# Patient Record
Sex: MALE | Race: BLACK OR AFRICAN AMERICAN | NOT HISPANIC OR LATINO | Employment: FULL TIME | URBAN - METROPOLITAN AREA
[De-identification: names, ages, dates, MRNs, and addresses within clinical notes are randomized per-mention and may not be internally consistent; named-entity substitution may affect disease eponyms.]

---

## 2021-02-12 ENCOUNTER — OFFICE VISIT (OUTPATIENT)
Dept: PRIMARY CARE CLINIC | Facility: CLINIC | Age: 26
End: 2021-02-12
Payer: COMMERCIAL

## 2021-02-12 VITALS
BODY MASS INDEX: 22.03 KG/M2 | TEMPERATURE: 98 F | WEIGHT: 145.38 LBS | OXYGEN SATURATION: 98 % | HEART RATE: 89 BPM | DIASTOLIC BLOOD PRESSURE: 82 MMHG | HEIGHT: 68 IN | SYSTOLIC BLOOD PRESSURE: 128 MMHG

## 2021-02-12 DIAGNOSIS — F43.10 PTSD (POST-TRAUMATIC STRESS DISORDER): ICD-10-CM

## 2021-02-12 DIAGNOSIS — E10.69 TYPE 1 DIABETES MELLITUS WITH OTHER SPECIFIED COMPLICATION: Primary | ICD-10-CM

## 2021-02-12 DIAGNOSIS — Z11.3 SCREENING FOR STD (SEXUALLY TRANSMITTED DISEASE): ICD-10-CM

## 2021-02-12 PROCEDURE — 99204 OFFICE O/P NEW MOD 45 MIN: CPT | Mod: S$GLB,,, | Performed by: INTERNAL MEDICINE

## 2021-02-12 PROCEDURE — 99204 PR OFFICE/OUTPT VISIT, NEW, LEVL IV, 45-59 MIN: ICD-10-PCS | Mod: S$GLB,,, | Performed by: INTERNAL MEDICINE

## 2021-02-12 RX ORDER — INSULIN GLARGINE 100 [IU]/ML
INJECTION, SOLUTION SUBCUTANEOUS
COMMUNITY
End: 2021-02-12 | Stop reason: SDUPTHER

## 2021-02-12 RX ORDER — INSULIN ASPART 100 [IU]/ML
INJECTION, SOLUTION INTRAVENOUS; SUBCUTANEOUS
COMMUNITY
End: 2021-02-12 | Stop reason: SDUPTHER

## 2021-02-12 RX ORDER — INSULIN GLARGINE 100 [IU]/ML
25 INJECTION, SOLUTION SUBCUTANEOUS DAILY
Qty: 3 BOX | Refills: 3 | Status: SHIPPED | OUTPATIENT
Start: 2021-02-12 | End: 2021-04-12 | Stop reason: SDUPTHER

## 2021-02-12 RX ORDER — INSULIN ASPART 100 [IU]/ML
5 INJECTION, SOLUTION INTRAVENOUS; SUBCUTANEOUS
Qty: 3 BOX | Refills: 3 | Status: SHIPPED | OUTPATIENT
Start: 2021-02-12 | End: 2021-04-12 | Stop reason: SDUPTHER

## 2021-02-13 LAB
BUN SERPL-MCNC: 12 MG/DL (ref 7–25)
BUN/CREAT SERPL: ABNORMAL (CALC) (ref 6–22)
CALCIUM SERPL-MCNC: 10.1 MG/DL (ref 8.6–10.3)
CHLORIDE SERPL-SCNC: 100 MMOL/L (ref 98–110)
CHOLEST SERPL-MCNC: 186 MG/DL
CHOLEST/HDLC SERPL: 2.5 (CALC)
CO2 SERPL-SCNC: 30 MMOL/L (ref 20–32)
CREAT SERPL-MCNC: 0.98 MG/DL (ref 0.6–1.35)
GFRSERPLBLD MDRD-ARVRAT: 106 ML/MIN/1.73M2
GLUCOSE SERPL-MCNC: 181 MG/DL (ref 65–99)
HBA1C MFR BLD: 7.7 % OF TOTAL HGB
HDLC SERPL-MCNC: 75 MG/DL
LDLC SERPL CALC-MCNC: 96 MG/DL (CALC)
NONHDLC SERPL-MCNC: 111 MG/DL (CALC)
POTASSIUM SERPL-SCNC: 4 MMOL/L (ref 3.5–5.3)
SODIUM SERPL-SCNC: 142 MMOL/L (ref 135–146)
TRIGL SERPL-MCNC: 68 MG/DL
TSH SERPL-ACNC: 1.08 MIU/L (ref 0.4–4.5)

## 2021-02-15 LAB
HCV AB S/CO SERPL IA: 0.01
HCV AB SERPL QL IA: NORMAL
HIV 1+2 AB+HIV1 P24 AG SERPL QL IA: NORMAL

## 2021-02-17 RX ORDER — LANCETS
1 EACH MISCELLANEOUS 3 TIMES DAILY
COMMUNITY
End: 2021-02-17 | Stop reason: SDUPTHER

## 2021-02-17 RX ORDER — CALCIUM CITRATE/VITAMIN D3 200MG-6.25
1 TABLET ORAL 3 TIMES DAILY
Qty: 100 EACH | Refills: 4 | Status: SHIPPED | OUTPATIENT
Start: 2021-02-17 | End: 2021-04-12 | Stop reason: SDUPTHER

## 2021-02-17 RX ORDER — LANCETS
1 EACH MISCELLANEOUS 3 TIMES DAILY
Qty: 100 EACH | Refills: 4 | Status: SHIPPED | OUTPATIENT
Start: 2021-02-17 | End: 2021-04-12 | Stop reason: SDUPTHER

## 2021-02-17 RX ORDER — DEXTROSE 4 G
1 TABLET,CHEWABLE ORAL ONCE
COMMUNITY
End: 2021-02-17 | Stop reason: SDUPTHER

## 2021-02-17 RX ORDER — DEXTROSE 4 G
1 TABLET,CHEWABLE ORAL ONCE
Qty: 1 EACH | Refills: 0 | Status: SHIPPED | OUTPATIENT
Start: 2021-02-17 | End: 2021-04-12 | Stop reason: SDUPTHER

## 2021-04-12 DIAGNOSIS — E10.69 TYPE 1 DIABETES MELLITUS WITH OTHER SPECIFIED COMPLICATION: ICD-10-CM

## 2021-04-13 RX ORDER — INSULIN ASPART 100 [IU]/ML
5 INJECTION, SOLUTION INTRAVENOUS; SUBCUTANEOUS
Qty: 3 BOX | Refills: 3 | Status: SHIPPED | OUTPATIENT
Start: 2021-04-13 | End: 2021-07-29 | Stop reason: SDUPTHER

## 2021-04-13 RX ORDER — LANCETS
1 EACH MISCELLANEOUS 3 TIMES DAILY
Qty: 100 EACH | Refills: 4 | Status: SHIPPED | OUTPATIENT
Start: 2021-04-13 | End: 2022-05-25 | Stop reason: SDUPTHER

## 2021-04-13 RX ORDER — DEXTROSE 4 G
1 TABLET,CHEWABLE ORAL ONCE
Qty: 1 EACH | Refills: 0 | Status: SHIPPED | OUTPATIENT
Start: 2021-04-13 | End: 2023-10-31 | Stop reason: CLARIF

## 2021-04-13 RX ORDER — CALCIUM CITRATE/VITAMIN D3 200MG-6.25
1 TABLET ORAL 3 TIMES DAILY
Qty: 100 EACH | Refills: 4 | Status: SHIPPED | OUTPATIENT
Start: 2021-04-13 | End: 2022-05-25 | Stop reason: SDUPTHER

## 2021-04-13 RX ORDER — INSULIN GLARGINE 100 [IU]/ML
25 INJECTION, SOLUTION SUBCUTANEOUS DAILY
Qty: 3 BOX | Refills: 3 | Status: SHIPPED | OUTPATIENT
Start: 2021-04-13 | End: 2021-06-24

## 2021-07-29 DIAGNOSIS — E10.69 TYPE 1 DIABETES MELLITUS WITH OTHER SPECIFIED COMPLICATION: ICD-10-CM

## 2021-07-29 RX ORDER — INSULIN ASPART 100 [IU]/ML
5 INJECTION, SOLUTION INTRAVENOUS; SUBCUTANEOUS
Qty: 3 BOX | Refills: 3 | Status: SHIPPED | OUTPATIENT
Start: 2021-07-29 | End: 2021-07-29 | Stop reason: SDUPTHER

## 2021-07-30 RX ORDER — INSULIN ASPART 100 [IU]/ML
12 INJECTION, SOLUTION INTRAVENOUS; SUBCUTANEOUS
Qty: 99 ML | Refills: 3 | Status: SHIPPED | OUTPATIENT
Start: 2021-07-30 | End: 2022-05-18 | Stop reason: SDUPTHER

## 2022-05-18 DIAGNOSIS — E10.69 TYPE 1 DIABETES MELLITUS WITH OTHER SPECIFIED COMPLICATION: ICD-10-CM

## 2022-05-18 RX ORDER — INSULIN ASPART 100 [IU]/ML
12 INJECTION, SOLUTION INTRAVENOUS; SUBCUTANEOUS
Qty: 99 ML | Refills: 3 | Status: SHIPPED | OUTPATIENT
Start: 2022-05-18 | End: 2022-05-25 | Stop reason: SDUPTHER

## 2022-05-18 RX ORDER — INSULIN GLARGINE 100 [IU]/ML
INJECTION, SOLUTION SUBCUTANEOUS
Qty: 180 ML | Refills: 3 | Status: SHIPPED | OUTPATIENT
Start: 2022-05-18 | End: 2022-05-25 | Stop reason: SDUPTHER

## 2022-05-18 NOTE — TELEPHONE ENCOUNTER
----- Message from Tania Lowe MA sent at 5/17/2022  3:19 PM CDT -----    ----- Message -----  From: Maria Del Rosario Loya  Sent: 5/17/2022   2:50 PM CDT  To: [facility] Staff    Type:  Same Day Appointment Request    Caller is requesting a same day appointment.  Caller declined first available appointment listed below.    Name of Caller: Modesto Nguyễn  When is the first available appointment? 05/25   Symptoms: Refills/ eye doctor referral   Best Call Back Number: [phone] (home)   Additional Information: Pt said he's completely out of his insulin and would like a refill sent to his pharmacy to last him one week until his appointment on the 25th.        He uses:   [id number] [address] AT [facility]  [address] [place] [id number]  Phone: [phone] Fax: [phone]

## 2022-05-25 ENCOUNTER — OFFICE VISIT (OUTPATIENT)
Dept: PRIMARY CARE CLINIC | Facility: CLINIC | Age: 27
End: 2022-05-25
Payer: COMMERCIAL

## 2022-05-25 VITALS
SYSTOLIC BLOOD PRESSURE: 143 MMHG | WEIGHT: 156 LBS | DIASTOLIC BLOOD PRESSURE: 99 MMHG | BODY MASS INDEX: 23.64 KG/M2 | OXYGEN SATURATION: 99 % | HEART RATE: 90 BPM | HEIGHT: 68 IN

## 2022-05-25 DIAGNOSIS — Z28.21 COVID-19 VACCINE DOSE DECLINED: ICD-10-CM

## 2022-05-25 DIAGNOSIS — Z11.3 SCREENING FOR STDS (SEXUALLY TRANSMITTED DISEASES): Primary | ICD-10-CM

## 2022-05-25 DIAGNOSIS — R03.0 ELEVATED BLOOD PRESSURE READING: ICD-10-CM

## 2022-05-25 DIAGNOSIS — E10.69 TYPE 1 DIABETES MELLITUS WITH OTHER SPECIFIED COMPLICATION: ICD-10-CM

## 2022-05-25 PROCEDURE — 3080F PR MOST RECENT DIASTOLIC BLOOD PRESSURE >= 90 MM HG: ICD-10-PCS | Mod: CPTII,S$GLB,, | Performed by: INTERNAL MEDICINE

## 2022-05-25 PROCEDURE — 3077F PR MOST RECENT SYSTOLIC BLOOD PRESSURE >= 140 MM HG: ICD-10-PCS | Mod: CPTII,S$GLB,, | Performed by: INTERNAL MEDICINE

## 2022-05-25 PROCEDURE — 99214 PR OFFICE/OUTPT VISIT, EST, LEVL IV, 30-39 MIN: ICD-10-PCS | Mod: S$GLB,,, | Performed by: INTERNAL MEDICINE

## 2022-05-25 PROCEDURE — 99214 OFFICE O/P EST MOD 30 MIN: CPT | Mod: S$GLB,,, | Performed by: INTERNAL MEDICINE

## 2022-05-25 PROCEDURE — 1159F PR MEDICATION LIST DOCUMENTED IN MEDICAL RECORD: ICD-10-PCS | Mod: CPTII,S$GLB,, | Performed by: INTERNAL MEDICINE

## 2022-05-25 PROCEDURE — 3008F PR BODY MASS INDEX (BMI) DOCUMENTED: ICD-10-PCS | Mod: CPTII,S$GLB,, | Performed by: INTERNAL MEDICINE

## 2022-05-25 PROCEDURE — 1159F MED LIST DOCD IN RCRD: CPT | Mod: CPTII,S$GLB,, | Performed by: INTERNAL MEDICINE

## 2022-05-25 PROCEDURE — 3008F BODY MASS INDEX DOCD: CPT | Mod: CPTII,S$GLB,, | Performed by: INTERNAL MEDICINE

## 2022-05-25 PROCEDURE — 3080F DIAST BP >= 90 MM HG: CPT | Mod: CPTII,S$GLB,, | Performed by: INTERNAL MEDICINE

## 2022-05-25 PROCEDURE — 3077F SYST BP >= 140 MM HG: CPT | Mod: CPTII,S$GLB,, | Performed by: INTERNAL MEDICINE

## 2022-05-25 RX ORDER — INSULIN ASPART 100 [IU]/ML
12 INJECTION, SOLUTION INTRAVENOUS; SUBCUTANEOUS
Qty: 99 ML | Refills: 3 | Status: SHIPPED | OUTPATIENT
Start: 2022-05-25 | End: 2022-08-04

## 2022-05-25 RX ORDER — PEN NEEDLE, DIABETIC 30 GX3/16"
1 NEEDLE, DISPOSABLE MISCELLANEOUS
Qty: 150 EACH | Refills: 3 | Status: SHIPPED | OUTPATIENT
Start: 2022-05-25 | End: 2022-12-01

## 2022-05-25 RX ORDER — LANCETS
1 EACH MISCELLANEOUS 3 TIMES DAILY
Qty: 100 EACH | Refills: 4 | Status: SHIPPED | OUTPATIENT
Start: 2022-05-25 | End: 2023-10-31 | Stop reason: CLARIF

## 2022-05-25 RX ORDER — INSULIN GLARGINE 100 [IU]/ML
INJECTION, SOLUTION SUBCUTANEOUS
Qty: 180 ML | Refills: 3 | Status: SHIPPED | OUTPATIENT
Start: 2022-05-25 | End: 2023-06-30 | Stop reason: SDUPTHER

## 2022-05-25 NOTE — PROGRESS NOTES
Subjective:      Patient ID: Modesto Nguyễn is a 27 y.o. male.    Chief Complaint: Annual Exam (211 FASTING; WOULD LIKE TO DISCUSS GETTING THE PUMP FOR HIS DIABETES. Pin needles needed for his insulin pen. )    HPI     Patient here after a year and states he started checking his blood glucose 2 weeks ago. He did not take his insulin today because he did not eat. He takes Lantus 25 units at bedtime and does not have a sliding scale but just spot doses himself 12 units. He reports having hypoglycemic episodes of around 58 in the morning as he was not eating a lot at night and his blood glucose ended up going low. Eats at 7 pm and breakfast is around 12 pm so is going without food for about 12 hours.   He works but doesn't always get up in time to have breakfast. I don't have any of his readings. Highest blood glucose he has had was 300 and then rechecked it and was 220     Smokes marijuana, no cigarettes, occasional alcohol  No recent hospitalizations for his diabetes       Review of Systems   Constitutional: Negative for chills and fever.   Eyes: Positive for blurred vision.   Respiratory: Negative for cough, shortness of breath and wheezing.    Cardiovascular: Negative for chest pain, palpitations and leg swelling.   Gastrointestinal: Negative for abdominal pain, constipation, diarrhea, nausea and vomiting.   Genitourinary: Negative for dysuria, frequency and urgency.   Musculoskeletal: Negative for falls and joint pain.   Skin: Negative for itching and rash.   Neurological: Negative for dizziness and headaches.   Psychiatric/Behavioral: Negative for depression. The patient is not nervous/anxious.      Objective:     Physical Exam  Vitals reviewed.   HENT:      Head: Normocephalic.   Eyes:      Extraocular Movements: Extraocular movements intact.      Conjunctiva/sclera: Conjunctivae normal.      Pupils: Pupils are equal, round, and reactive to light.   Cardiovascular:      Rate and Rhythm: Normal rate and regular  "rhythm.   Pulmonary:      Effort: Pulmonary effort is normal.      Breath sounds: Normal breath sounds.   Abdominal:      General: Bowel sounds are normal.   Musculoskeletal:         General: Normal range of motion.   Skin:     General: Skin is warm.      Capillary Refill: Capillary refill takes less than 2 seconds.   Neurological:      General: No focal deficit present.      Mental Status: He is alert and oriented to person, place, and time.   Psychiatric:         Mood and Affect: Mood normal.        BP (!) 143/99 (BP Location: Right arm, Patient Position: Sitting, BP Method: Medium (Automatic))   Pulse 90   Ht 5' 8" (1.727 m)   Wt 70.8 kg (156 lb)   SpO2 99%   BMI 23.72 kg/m²     Assessment:       ICD-10-CM ICD-9-CM   1. Screening for STDs (sexually transmitted diseases)  Z11.3 V74.5   2. Type 1 diabetes mellitus with other specified complication  E10.69 250.81   3. Elevated blood pressure reading  R03.0 796.2   4. COVID-19 vaccine dose declined  Z28.21 V64.06       Plan:     Medication List with Changes/Refills   New Medications    PEN NEEDLE, DIABETIC (PEN NEEDLE) 32 GAUGE X 5/32" NDLE    1 each by Misc.(Non-Drug; Combo Route) route 5 (five) times daily.   Current Medications    BLOOD-GLUCOSE METER MISC    1 Device by Misc.(Non-Drug; Combo Route) route once. Please give patient Riaz metrix  monitor to check sugar three times daily. for 1 dose   Changed and/or Refilled Medications    Modified Medication Previous Medication    BLOOD SUGAR DIAGNOSTIC (TRUE METRIX GLUCOSE TEST STRIP) STRP blood sugar diagnostic (TRUE METRIX GLUCOSE TEST STRIP) Strp       1 strip by Misc.(Non-Drug; Combo Route) route 3 (three) times daily. Use monitor and strips to check blood sugar three times daily.    1 strip by Misc.(Non-Drug; Combo Route) route 3 (three) times daily. Use monitor and strips to check blood sugar three times daily.    INSULIN (LANTUS SOLOSTAR U-100 INSULIN) GLARGINE 100 UNITS/ML (3ML) SUBQ PEN insulin (LANTUS " SOLOSTAR U-100 INSULIN) glargine 100 units/mL (3mL) SubQ pen       INJECT 25 UNITS UNDER THE SKIN EVERY DAY    INJECT 25 UNITS UNDER THE SKIN EVERY DAY    INSULIN ASPART U-100 (NOVOLOG FLEXPEN U-100 INSULIN) 100 UNIT/ML (3 ML) INPN PEN insulin aspart U-100 (NOVOLOG FLEXPEN U-100 INSULIN) 100 unit/mL (3 mL) InPn pen       Inject 12 Units into the skin 3 (three) times daily with meals.    Inject 12 Units into the skin 3 (three) times daily with meals.    LANCETS (LANCETS,ULTRA THIN) 26 GAUGE MISC lancets (LANCETS,ULTRA THIN) 26 gauge Misc       1 lancet by Misc.(Non-Drug; Combo Route) route 3 (three) times daily. Please give patient lancets and lancing device to check sugars three times daily.    1 lancet by Misc.(Non-Drug; Combo Route) route 3 (three) times daily. Please give patient lancets and lancing device to check sugars three times daily.        Screening for STDs (sexually transmitted diseases)  -     HIV 1/2 Ag/Ab (4th Gen); Future; Expected date: 05/25/2022  -     Hepatitis C Antibody; Future; Expected date: 05/25/2022    Type 1 diabetes mellitus with other specified complication  -     insulin aspart U-100 (NOVOLOG FLEXPEN U-100 INSULIN) 100 unit/mL (3 mL) InPn pen; Inject 12 Units into the skin 3 (three) times daily with meals.  Dispense: 99 mL; Refill: 3  -     insulin (LANTUS SOLOSTAR U-100 INSULIN) glargine 100 units/mL (3mL) SubQ pen; INJECT 25 UNITS UNDER THE SKIN EVERY DAY  Dispense: 180 mL; Refill: 3  -     blood sugar diagnostic (TRUE METRIX GLUCOSE TEST STRIP) Strp; 1 strip by Misc.(Non-Drug; Combo Route) route 3 (three) times daily. Use monitor and strips to check blood sugar three times daily.  Dispense: 100 each; Refill: 4  -     lancets (LANCETS,ULTRA THIN) 26 gauge Misc; 1 lancet by Misc.(Non-Drug; Combo Route) route 3 (three) times daily. Please give patient lancets and lancing device to check sugars three times daily.  Dispense: 100 each; Refill: 4  -     pen needle, diabetic (PEN NEEDLE)  "32 gauge x 5/32" Ndle; 1 each by Misc.(Non-Drug; Combo Route) route 5 (five) times daily.  Dispense: 150 each; Refill: 3  -     CBC Auto Differential; Future; Expected date: 05/25/2022  -     Comprehensive Metabolic Panel; Future; Expected date: 05/25/2022  -     Lipid Panel; Future; Expected date: 05/25/2022  -     Hemoglobin A1C; Future; Expected date: 05/25/2022  -     TSH; Future; Expected date: 05/25/2022  -     Ambulatory referral/consult to Ophthalmology; Future; Expected date: 06/01/2022  -     Microalbumin/creatinine urine ratio    Elevated blood pressure reading    COVID-19 vaccine dose declined                          "

## 2022-05-26 LAB
ALBUMIN SERPL-MCNC: 4.6 G/DL (ref 3.6–5.1)
ALBUMIN/CREAT UR: 287 MCG/MG CREAT
ALBUMIN/GLOB SERPL: 1.6 (CALC) (ref 1–2.5)
ALP SERPL-CCNC: 82 U/L (ref 36–130)
ALT SERPL-CCNC: 12 U/L (ref 9–46)
AST SERPL-CCNC: 16 U/L (ref 10–40)
BASOPHILS # BLD AUTO: 18 CELLS/UL (ref 0–200)
BASOPHILS NFR BLD AUTO: 0.4 %
BILIRUB SERPL-MCNC: 0.7 MG/DL (ref 0.2–1.2)
BUN SERPL-MCNC: 10 MG/DL (ref 7–25)
BUN/CREAT SERPL: ABNORMAL (CALC) (ref 6–22)
CALCIUM SERPL-MCNC: 9.8 MG/DL (ref 8.6–10.3)
CHLORIDE SERPL-SCNC: 101 MMOL/L (ref 98–110)
CHOLEST SERPL-MCNC: 216 MG/DL
CHOLEST/HDLC SERPL: 3 (CALC)
CO2 SERPL-SCNC: 30 MMOL/L (ref 20–32)
CREAT SERPL-MCNC: 0.9 MG/DL (ref 0.6–1.35)
CREAT UR-MCNC: 149 MG/DL (ref 20–320)
EOSINOPHIL # BLD AUTO: 198 CELLS/UL (ref 15–500)
EOSINOPHIL NFR BLD AUTO: 4.3 %
ERYTHROCYTE [DISTWIDTH] IN BLOOD BY AUTOMATED COUNT: 13.1 % (ref 11–15)
GLOBULIN SER CALC-MCNC: 2.8 G/DL (CALC) (ref 1.9–3.7)
GLUCOSE SERPL-MCNC: 207 MG/DL (ref 65–99)
HBA1C MFR BLD: 9.6 % OF TOTAL HGB
HCT VFR BLD AUTO: 45.6 % (ref 38.5–50)
HCV AB S/CO SERPL IA: 0.02
HCV AB SERPL QL IA: NORMAL
HDLC SERPL-MCNC: 71 MG/DL
HGB BLD-MCNC: 15.2 G/DL (ref 13.2–17.1)
HIV 1+2 AB+HIV1 P24 AG SERPL QL IA: NORMAL
LDLC SERPL CALC-MCNC: 131 MG/DL (CALC)
LYMPHOCYTES # BLD AUTO: 1403 CELLS/UL (ref 850–3900)
LYMPHOCYTES NFR BLD AUTO: 30.5 %
MCH RBC QN AUTO: 29.6 PG (ref 27–33)
MCHC RBC AUTO-ENTMCNC: 33.3 G/DL (ref 32–36)
MCV RBC AUTO: 88.9 FL (ref 80–100)
MICROALBUMIN UR-MCNC: 42.7 MG/DL
MONOCYTES # BLD AUTO: 368 CELLS/UL (ref 200–950)
MONOCYTES NFR BLD AUTO: 8 %
NEUTROPHILS # BLD AUTO: 2613 CELLS/UL (ref 1500–7800)
NEUTROPHILS NFR BLD AUTO: 56.8 %
NONHDLC SERPL-MCNC: 145 MG/DL (CALC)
PLATELET # BLD AUTO: 275 THOUSAND/UL (ref 140–400)
PMV BLD REES-ECKER: 11.8 FL (ref 7.5–12.5)
POTASSIUM SERPL-SCNC: 4.3 MMOL/L (ref 3.5–5.3)
PROT SERPL-MCNC: 7.4 G/DL (ref 6.1–8.1)
RBC # BLD AUTO: 5.13 MILLION/UL (ref 4.2–5.8)
SODIUM SERPL-SCNC: 139 MMOL/L (ref 135–146)
TRIGL SERPL-MCNC: 47 MG/DL
TSH SERPL-ACNC: 1.46 MIU/L (ref 0.4–4.5)
WBC # BLD AUTO: 4.6 THOUSAND/UL (ref 3.8–10.8)

## 2022-06-02 ENCOUNTER — TELEPHONE (OUTPATIENT)
Dept: PRIMARY CARE CLINIC | Facility: CLINIC | Age: 27
End: 2022-06-02
Payer: COMMERCIAL

## 2022-06-02 NOTE — TELEPHONE ENCOUNTER
"Wanted to r/s his appt due to a "viral infection."     ----- Message from Padmini Rob sent at 6/2/2022  2:45 PM CDT -----  Contact: PT      Who Called Modesto      Does the patient know what this is regarding?: glucose readings   Would the patient rather a call back or a response via [facility]?  Callback   Best Call Back Number: .[phone] (home)        Additional Information:  pt could not drop off results            "

## 2022-06-22 ENCOUNTER — TELEPHONE (OUTPATIENT)
Dept: PRIMARY CARE CLINIC | Facility: CLINIC | Age: 27
End: 2022-06-22
Payer: COMMERCIAL

## 2022-06-22 NOTE — TELEPHONE ENCOUNTER
Pt advised all his labs were normal from this testing except for the diabetes and he would discuss this with her at the next visit. Pt verbalized understanding.       ----- Message from Monalisa Chapman LPN sent at 6/21/2022  4:08 PM CDT -----  Regarding: FW: Blood Work  Contact: patient    ----- Message -----  From: Eugenia Guillen  Sent: 6/21/2022   3:16 PM CDT  To: [facility] Staff  Subject: Blood Work                                       Per phone call with patient, she stated that he would like to have blood work to be done to check to see if cancer,HIV.  The caller would like to be tested for the whole thing.  Please return call at [phone].    Thanks,  SJ

## 2022-06-23 ENCOUNTER — OFFICE VISIT (OUTPATIENT)
Dept: PRIMARY CARE CLINIC | Facility: CLINIC | Age: 27
End: 2022-06-23
Payer: COMMERCIAL

## 2022-06-23 VITALS
BODY MASS INDEX: 23.31 KG/M2 | HEIGHT: 68 IN | OXYGEN SATURATION: 98 % | SYSTOLIC BLOOD PRESSURE: 131 MMHG | DIASTOLIC BLOOD PRESSURE: 82 MMHG | WEIGHT: 153.81 LBS | HEART RATE: 88 BPM

## 2022-06-23 DIAGNOSIS — U07.1 COVID-19: Primary | ICD-10-CM

## 2022-06-23 PROCEDURE — 99214 OFFICE O/P EST MOD 30 MIN: CPT | Mod: S$GLB,,, | Performed by: INTERNAL MEDICINE

## 2022-06-23 PROCEDURE — 3079F DIAST BP 80-89 MM HG: CPT | Mod: CPTII,S$GLB,, | Performed by: INTERNAL MEDICINE

## 2022-06-23 PROCEDURE — 3060F PR POS MICROALBUMINURIA RESULT DOCUMENTED/REVIEW: ICD-10-PCS | Mod: CPTII,S$GLB,, | Performed by: INTERNAL MEDICINE

## 2022-06-23 PROCEDURE — 3046F HEMOGLOBIN A1C LEVEL >9.0%: CPT | Mod: CPTII,S$GLB,, | Performed by: INTERNAL MEDICINE

## 2022-06-23 PROCEDURE — 3066F PR DOCUMENTATION OF TREATMENT FOR NEPHROPATHY: ICD-10-PCS | Mod: CPTII,S$GLB,, | Performed by: INTERNAL MEDICINE

## 2022-06-23 PROCEDURE — 3008F PR BODY MASS INDEX (BMI) DOCUMENTED: ICD-10-PCS | Mod: CPTII,S$GLB,, | Performed by: INTERNAL MEDICINE

## 2022-06-23 PROCEDURE — 3066F NEPHROPATHY DOC TX: CPT | Mod: CPTII,S$GLB,, | Performed by: INTERNAL MEDICINE

## 2022-06-23 PROCEDURE — 3075F SYST BP GE 130 - 139MM HG: CPT | Mod: CPTII,S$GLB,, | Performed by: INTERNAL MEDICINE

## 2022-06-23 PROCEDURE — 99214 PR OFFICE/OUTPT VISIT, EST, LEVL IV, 30-39 MIN: ICD-10-PCS | Mod: S$GLB,,, | Performed by: INTERNAL MEDICINE

## 2022-06-23 PROCEDURE — 3079F PR MOST RECENT DIASTOLIC BLOOD PRESSURE 80-89 MM HG: ICD-10-PCS | Mod: CPTII,S$GLB,, | Performed by: INTERNAL MEDICINE

## 2022-06-23 PROCEDURE — 3046F PR MOST RECENT HEMOGLOBIN A1C LEVEL > 9.0%: ICD-10-PCS | Mod: CPTII,S$GLB,, | Performed by: INTERNAL MEDICINE

## 2022-06-23 PROCEDURE — 3075F PR MOST RECENT SYSTOLIC BLOOD PRESS GE 130-139MM HG: ICD-10-PCS | Mod: CPTII,S$GLB,, | Performed by: INTERNAL MEDICINE

## 2022-06-23 PROCEDURE — 3060F POS MICROALBUMINURIA REV: CPT | Mod: CPTII,S$GLB,, | Performed by: INTERNAL MEDICINE

## 2022-06-23 PROCEDURE — 3008F BODY MASS INDEX DOCD: CPT | Mod: CPTII,S$GLB,, | Performed by: INTERNAL MEDICINE

## 2022-06-23 NOTE — PROGRESS NOTES
"Subjective:      Patient ID: Modesto Nguyễn is a 27 y.o. male.    Chief Complaint: Follow-up, Cough (Patient states he has had all s/s for one week with the diarrhea started two days ago. Pt states he went to [facility] about two -3 weeks ago URI. Both flu and COVID test were negative at that time. He never did get his antibiotics filled. ), Generalized Body Aches, Diarrhea, and Nasal Congestion    HPI     Patient tested positive for covid in clinic today. He is still refusing the vaccine. He did not bring his blood glucose log but he states he is checking it 4-5 times a day and giving himself 5 injections a day. He reports a level of 36 an hour after he took his novolog because he did not count his carbs properly. He just drove in from [place]    ROS     As above  Objective:     Physical Exam  Vitals reviewed.   Constitutional:       Appearance: Normal appearance.   HENT:      Head: Normocephalic.   Eyes:      Extraocular Movements: Extraocular movements intact.      Conjunctiva/sclera: Conjunctivae normal.      Pupils: Pupils are equal, round, and reactive to light.   Skin:     General: Skin is warm.      Capillary Refill: Capillary refill takes less than 2 seconds.   Neurological:      General: No focal deficit present.      Mental Status: He is alert.   Psychiatric:         Mood and Affect: Mood normal.        /82 (BP Location: Right arm, Patient Position: Sitting, BP Method: Medium (Automatic))   Pulse 88   Ht 5' 8" (1.727 m)   Wt 69.8 kg (153 lb 12.8 oz)   SpO2 98%   BMI 23.39 kg/m²     Assessment:       ICD-10-CM ICD-9-CM   1. COVID-19  U07.1 079.89       Plan:     Medication List with Changes/Refills   New Medications    NIRMATRELVIR-RITONAVIR 150 MG X 2- 100 MG COPACKAGED TABLETS (EUA)    Take 3 tablets by mouth 2 (two) times daily for 5 days. Each dose contains 2 nirmatrelvir (pink tablets) and 1 ritonavir (white tablet). Take all 3 tablets together   Current Medications    BLOOD SUGAR DIAGNOSTIC " "(TRUE METRIX GLUCOSE TEST STRIP) STRP    1 strip by Misc.(Non-Drug; Combo Route) route 3 (three) times daily. Use monitor and strips to check blood sugar three times daily.    BLOOD-GLUCOSE METER MISC    1 Device by Misc.(Non-Drug; Combo Route) route once. Please give patient Riaz metrix  monitor to check sugar three times daily. for 1 dose    INSULIN (LANTUS SOLOSTAR U-100 INSULIN) GLARGINE 100 UNITS/ML (3ML) SUBQ PEN    INJECT 25 UNITS UNDER THE SKIN EVERY DAY    INSULIN ASPART U-100 (NOVOLOG FLEXPEN U-100 INSULIN) 100 UNIT/ML (3 ML) INPN PEN    Inject 12 Units into the skin 3 (three) times daily with meals.    LANCETS (LANCETS,ULTRA THIN) 26 GAUGE MISC    1 lancet by Misc.(Non-Drug; Combo Route) route 3 (three) times daily. Please give patient lancets and lancing device to check sugars three times daily.    PEN NEEDLE, DIABETIC (PEN NEEDLE) 32 GAUGE X 5/32" NDLE    1 each by Misc.(Non-Drug; Combo Route) route 5 (five) times daily.        COVID-19  -     nirmatrelvir-ritonavir 150 mg x 2- 100 mg copackaged tablets (EUA); Take 3 tablets by mouth 2 (two) times daily for 5 days. Each dose contains 2 nirmatrelvir (pink tablets) and 1 ritonavir (white tablet). Take all 3 tablets together  Dispense: 30 tablet; Refill: 0         Patient info on paxlovid provided. He will return in 2 weeks with his blood glucose report. I discussed starting omnipod and getting him a dexcom and he is interested. Monitor blood glucose and if appetite is decreased due to nausea/diarrhea he needs to decrease his short acting insulin. Needs to drink plenty of water. Vitals stable in clinic today                  "

## 2022-08-02 DIAGNOSIS — E10.69 TYPE 1 DIABETES MELLITUS WITH OTHER SPECIFIED COMPLICATION: ICD-10-CM

## 2022-08-04 RX ORDER — INSULIN ASPART 100 [IU]/ML
INJECTION, SOLUTION INTRAVENOUS; SUBCUTANEOUS
Qty: 99 ML | Refills: 3 | Status: SHIPPED | OUTPATIENT
Start: 2022-08-04 | End: 2023-06-30 | Stop reason: SDUPTHER

## 2022-09-09 ENCOUNTER — PATIENT OUTREACH (OUTPATIENT)
Dept: ADMINISTRATIVE | Facility: HOSPITAL | Age: 27
End: 2022-09-09
Payer: COMMERCIAL

## 2022-09-09 NOTE — PROGRESS NOTES
Working a1c gap report. Pt is overdue for a repeat A1C. All other DM labs are up to date. Pt was a no show for his last appt. Msg sent to PCP about A1C being due.

## 2022-09-09 NOTE — Clinical Note
I am currently working a report for pts with DM. Pt is overdue for a repeat A1C. Was a no show for last appt. Thank you.

## 2023-02-14 ENCOUNTER — PATIENT OUTREACH (OUTPATIENT)
Dept: ADMINISTRATIVE | Facility: HOSPITAL | Age: 28
End: 2023-02-14
Payer: COMMERCIAL

## 2023-02-14 NOTE — PROGRESS NOTES
DM Report: Attempted to contact pt in regards to overdue Ha1c,and offer follow up appt with provider, no ans, unable to lvm, last appt 06/23/22.

## 2023-06-29 ENCOUNTER — PATIENT MESSAGE (OUTPATIENT)
Dept: PRIMARY CARE CLINIC | Facility: CLINIC | Age: 28
End: 2023-06-29
Payer: COMMERCIAL

## 2023-06-30 ENCOUNTER — PATIENT MESSAGE (OUTPATIENT)
Dept: PRIMARY CARE CLINIC | Facility: CLINIC | Age: 28
End: 2023-06-30
Payer: COMMERCIAL

## 2023-06-30 DIAGNOSIS — E10.69 TYPE 1 DIABETES MELLITUS WITH OTHER SPECIFIED COMPLICATION: ICD-10-CM

## 2023-06-30 NOTE — TELEPHONE ENCOUNTER
"----- Message from Soraya Matute sent at 6/29/2023  1:26 PM CDT -----  Contact: pt  Pt calling for status of refill for NOVOLOG FLEXPEN U-100 INSULIN 100 unit/mL (3 mL) InPn pen, pen needle, diabetic (BD TUSHAR 2ND GEN PEN NEEDLE) 32 gauge x 5/32" Ndle and basalog insulin and pt is out and needs refills asap.  Pt can be reached at [phone].  Pt called yesterday w/no reply yet.        [id number] - [address] [address]  Phone: [phone] Fax: [phone]    Thanks,            "

## 2023-07-01 RX ORDER — INSULIN GLARGINE 100 [IU]/ML
INJECTION, SOLUTION SUBCUTANEOUS
Qty: 180 ML | Refills: 3 | Status: SHIPPED | OUTPATIENT
Start: 2023-07-01 | End: 2023-07-07 | Stop reason: ALTCHOICE

## 2023-07-01 RX ORDER — PEN NEEDLE, DIABETIC 30 GX3/16"
NEEDLE, DISPOSABLE MISCELLANEOUS
Qty: 200 EACH | Refills: 3 | Status: SHIPPED | OUTPATIENT
Start: 2023-07-01 | End: 2023-10-31

## 2023-07-01 RX ORDER — INSULIN ASPART 100 [IU]/ML
INJECTION, SOLUTION INTRAVENOUS; SUBCUTANEOUS
Qty: 99 ML | Refills: 3 | Status: SHIPPED | OUTPATIENT
Start: 2023-07-01 | End: 2024-03-13 | Stop reason: SDUPTHER

## 2023-07-05 ENCOUNTER — TELEPHONE (OUTPATIENT)
Dept: PRIMARY CARE CLINIC | Facility: CLINIC | Age: 28
End: 2023-07-05
Payer: MEDICAID

## 2023-07-05 NOTE — TELEPHONE ENCOUNTER
Patient has been advised his med has been sent to the pharmacy but must make an appointment for a follow up, since it has been over a year. Pt parked to schedule with  MEGHANA Baer.     My Note Signed   2:36 PM       ----- Message from Luann Hall sent at 7/5/2023 10:03 AM CDT -----  Patient is calling in regards to medication not sent to pharmacy..please call him back at [phone]

## 2023-07-05 NOTE — TELEPHONE ENCOUNTER
----- Message from Luann Hall sent at 7/5/2023 10:03 AM CDT -----  Patient is calling in regards to medication not sent to pharmacy..please call him back at [phone]

## 2023-07-06 ENCOUNTER — TELEPHONE (OUTPATIENT)
Dept: PRIMARY CARE CLINIC | Facility: CLINIC | Age: 28
End: 2023-07-06
Payer: MEDICAID

## 2023-07-06 NOTE — TELEPHONE ENCOUNTER
The pharmacy states the insurance is asking for the Basaglar.     ----- Message from Stella Perez sent at 7/6/2023  1:25 PM CDT -----  Contact: self  Pt states the generic version of lantus is needing to be called out, what was called out is not covered by his insurance. Please call back at [phone]

## 2023-07-07 DIAGNOSIS — E10.69 TYPE 1 DIABETES MELLITUS WITH OTHER SPECIFIED COMPLICATION: Primary | ICD-10-CM

## 2023-07-07 RX ORDER — INSULIN GLARGINE 100 [IU]/ML
25 INJECTION, SOLUTION SUBCUTANEOUS DAILY
Qty: 25 ML | Refills: 0 | Status: SHIPPED | OUTPATIENT
Start: 2023-07-07 | End: 2023-08-20

## 2023-07-26 ENCOUNTER — OFFICE VISIT (OUTPATIENT)
Dept: PRIMARY CARE CLINIC | Facility: CLINIC | Age: 28
End: 2023-07-26
Payer: MEDICAID

## 2023-07-26 ENCOUNTER — PATIENT MESSAGE (OUTPATIENT)
Dept: ADMINISTRATIVE | Facility: HOSPITAL | Age: 28
End: 2023-07-26
Payer: MEDICAID

## 2023-07-26 VITALS
OXYGEN SATURATION: 97 % | DIASTOLIC BLOOD PRESSURE: 92 MMHG | HEIGHT: 68 IN | SYSTOLIC BLOOD PRESSURE: 135 MMHG | WEIGHT: 155 LBS | BODY MASS INDEX: 23.49 KG/M2

## 2023-07-26 DIAGNOSIS — E10.69 TYPE 1 DIABETES MELLITUS WITH OTHER SPECIFIED COMPLICATION: Primary | ICD-10-CM

## 2023-07-26 DIAGNOSIS — Z11.3 SCREENING EXAMINATION FOR STD (SEXUALLY TRANSMITTED DISEASE): ICD-10-CM

## 2023-07-26 PROCEDURE — 99214 PR OFFICE/OUTPT VISIT, EST, LEVL IV, 30-39 MIN: ICD-10-PCS | Mod: S$GLB,,, | Performed by: INTERNAL MEDICINE

## 2023-07-26 PROCEDURE — 3008F BODY MASS INDEX DOCD: CPT | Mod: CPTII,S$GLB,, | Performed by: INTERNAL MEDICINE

## 2023-07-26 PROCEDURE — 3075F SYST BP GE 130 - 139MM HG: CPT | Mod: CPTII,S$GLB,, | Performed by: INTERNAL MEDICINE

## 2023-07-26 PROCEDURE — 99214 OFFICE O/P EST MOD 30 MIN: CPT | Mod: S$GLB,,, | Performed by: INTERNAL MEDICINE

## 2023-07-26 PROCEDURE — 3080F DIAST BP >= 90 MM HG: CPT | Mod: CPTII,S$GLB,, | Performed by: INTERNAL MEDICINE

## 2023-07-26 PROCEDURE — 3008F PR BODY MASS INDEX (BMI) DOCUMENTED: ICD-10-PCS | Mod: CPTII,S$GLB,, | Performed by: INTERNAL MEDICINE

## 2023-07-26 PROCEDURE — 3080F PR MOST RECENT DIASTOLIC BLOOD PRESSURE >= 90 MM HG: ICD-10-PCS | Mod: CPTII,S$GLB,, | Performed by: INTERNAL MEDICINE

## 2023-07-26 PROCEDURE — 3075F PR MOST RECENT SYSTOLIC BLOOD PRESS GE 130-139MM HG: ICD-10-PCS | Mod: CPTII,S$GLB,, | Performed by: INTERNAL MEDICINE

## 2023-07-26 NOTE — PROGRESS NOTES
Subjective:      Patient ID: Modesto Nguyễn is a 28 y.o. male.    Chief Complaint: Referral (To the eye clinic- due to a floater for a few months intermittent. )       HPI    Past Medical History:   Diagnosis Date    COVID     Type 1 diabetes mellitus        Patient presents to clinic after a year. He is type 1 diabetic on insulin   Basaglar 25 units, reports hypoglycemia of 42, states morning blood glucose today was 130. He is spot dosing himself with novolog as he is not checking his blood glucose regularly, likely that is the reason he has hypoglycemia   I did refer him to the eye clinic last year but he didn't go, states he is seeing floaters in his eyes and needs another referral    Sexually active, once change in partner recently in the last 2 months      Review of Systems   Constitutional:  Negative for chills and fever.   HENT:  Negative for hearing loss.    Eyes:  Negative for blurred vision.        Floaters   Respiratory:  Negative for cough, shortness of breath and wheezing.    Cardiovascular:  Negative for chest pain, palpitations and leg swelling.   Gastrointestinal:  Negative for abdominal pain, blood in stool, constipation, diarrhea, melena, nausea and vomiting.   Genitourinary:  Negative for dysuria, frequency and urgency.   Musculoskeletal:  Negative for falls.   Skin:  Negative for rash.   Neurological:  Negative for dizziness and headaches.   Endo/Heme/Allergies:  Does not bruise/bleed easily.   Psychiatric/Behavioral:  Negative for depression. The patient is not nervous/anxious.    Objective:     Physical Exam  Vitals reviewed.   Constitutional:       Appearance: Normal appearance.   HENT:      Head: Normocephalic.      Mouth/Throat:      Mouth: Mucous membranes are moist.      Pharynx: Oropharynx is clear.   Eyes:      Extraocular Movements: Extraocular movements intact.      Conjunctiva/sclera: Conjunctivae normal.      Pupils: Pupils are equal, round, and reactive to light.   Cardiovascular:  "     Rate and Rhythm: Normal rate and regular rhythm.   Pulmonary:      Effort: Pulmonary effort is normal.      Breath sounds: Normal breath sounds.   Abdominal:      General: Bowel sounds are normal.   Musculoskeletal:      Right lower leg: No edema.      Left lower leg: No edema.   Skin:     General: Skin is warm.      Capillary Refill: Capillary refill takes less than 2 seconds.   Neurological:      General: No focal deficit present.      Mental Status: He is alert and oriented to person, place, and time.   Psychiatric:         Mood and Affect: Mood normal.     BP (!) 135/92 (BP Location: Right arm, Patient Position: Sitting, BP Method: Medium (Automatic))   Ht 5' 8" (1.727 m)   Wt 70.3 kg (155 lb)   SpO2 97%   BMI 23.57 kg/m²     Assessment:       ICD-10-CM ICD-9-CM   1. Type 1 diabetes mellitus with other specified complication  E10.69 250.81   2. Screening examination for STD (sexually transmitted disease)  Z11.3 V74.5       Plan:     Medication List with Changes/Refills   Current Medications    BLOOD SUGAR DIAGNOSTIC (TRUE METRIX GLUCOSE TEST STRIP) STRP    1 strip by Misc.(Non-Drug; Combo Route) route 3 (three) times daily. Use monitor and strips to check blood sugar three times daily.    BLOOD-GLUCOSE METER MISC    1 Device by Misc.(Non-Drug; Combo Route) route once. Please give patient Riaz metrix  monitor to check sugar three times daily. for 1 dose    INSULIN (BASAGLAR KWIKPEN U-100 INSULIN) GLARGINE 100 UNITS/ML SUBQ PEN    Inject 25 Units into the skin once daily.    LANCETS (LANCETS,ULTRA THIN) 26 GAUGE MISC    1 lancet by Misc.(Non-Drug; Combo Route) route 3 (three) times daily. Please give patient lancets and lancing device to check sugars three times daily.    NOVOLOG FLEXPEN U-100 INSULIN 100 UNIT/ML (3 ML) INPN PEN    INJECT 12 UNITS SUBCUTANEOUS THREE TIMES DAILY WITH MEALS    PEN NEEDLE, DIABETIC (BD TUSHAR 2ND GEN PEN NEEDLE) 32 GAUGE X 5/32" NDLE    USE AS DIRECTED 5 TIMES DAILY        1. " Type 1 diabetes mellitus with other specified complication  -     Ambulatory referral/consult to Ophthalmology; Future; Expected date: 08/02/2023  -     Basic Metabolic Panel; Future; Expected date: 07/26/2023  -     Lipid Panel; Future; Expected date: 07/26/2023  -     Hemoglobin A1C; Future; Expected date: 07/26/2023    2. Screening examination for STD (sexually transmitted disease)  -     C. trachomatis/N. gonorrhoeae by AMP DNA [facility]; Urine; Future; Expected date: 07/26/2023         Advised if more than 2 no shows he will need to find another provider.     He was given a blood glucose log booklet to note down blood glucose so that we can start paperwork on possibly getting him a dexcom but advised it can only happen if he keeps his scheduled appointments   Also given a sliding scale      Future Appointments   Date Time Provider Department Center   7/26/2023 10:35 AM LAB, [facility] OB [address] [facility]7 [facility]   10/31/2023  8:20 AM Nicol Miller MD [facility] PRICG5 [facility]

## 2023-08-10 LAB
LEFT EYE DM RETINOPATHY: NEGATIVE
RIGHT EYE DM RETINOPATHY: NEGATIVE

## 2023-08-11 ENCOUNTER — PATIENT OUTREACH (OUTPATIENT)
Dept: ADMINISTRATIVE | Facility: HOSPITAL | Age: 28
End: 2023-08-11
Payer: MEDICAID

## 2023-08-18 DIAGNOSIS — E10.69 TYPE 1 DIABETES MELLITUS WITH OTHER SPECIFIED COMPLICATION: ICD-10-CM

## 2023-08-20 RX ORDER — INSULIN GLARGINE 100 [IU]/ML
25 INJECTION, SOLUTION SUBCUTANEOUS
Qty: 15 EACH | Refills: 3 | Status: SHIPPED | OUTPATIENT
Start: 2023-08-20 | End: 2023-09-01 | Stop reason: ALTCHOICE

## 2023-08-29 LAB
LEFT EYE DM RETINOPATHY: POSITIVE
RIGHT EYE DM RETINOPATHY: POSITIVE

## 2023-08-30 ENCOUNTER — TELEPHONE (OUTPATIENT)
Dept: PRIMARY CARE CLINIC | Facility: CLINIC | Age: 28
End: 2023-08-30
Payer: MEDICAID

## 2023-08-30 NOTE — TELEPHONE ENCOUNTER
Pt I ns has denied the request for Basglar but more information has been sent to his insulin with other meds on preferred list that he has already tried--Lantus Solo star and he is continuing to take the Novolog

## 2023-09-01 ENCOUNTER — TELEPHONE (OUTPATIENT)
Dept: PRIMARY CARE CLINIC | Facility: CLINIC | Age: 28
End: 2023-09-01
Payer: MEDICAID

## 2023-09-01 ENCOUNTER — PATIENT MESSAGE (OUTPATIENT)
Dept: PRIMARY CARE CLINIC | Facility: CLINIC | Age: 28
End: 2023-09-01
Payer: MEDICAID

## 2023-09-01 DIAGNOSIS — E10.69 TYPE 1 DIABETES MELLITUS WITH OTHER SPECIFIED COMPLICATION: Primary | ICD-10-CM

## 2023-09-01 RX ORDER — INSULIN GLARGINE 100 [IU]/ML
25 INJECTION, SOLUTION SUBCUTANEOUS DAILY
Qty: 10 EACH | Refills: 3 | Status: SHIPPED | OUTPATIENT
Start: 2023-09-01 | End: 2023-12-26

## 2023-09-01 NOTE — TELEPHONE ENCOUNTER
Pt's ins has denied the request for Basagleric [place] needing the reason why pt cannot take the preferred med Lantus Solostar,Levemir or generic lantus-insulin glargine so that I can let his ins know why this med is medically necessary

## 2023-09-05 ENCOUNTER — TELEPHONE (OUTPATIENT)
Dept: PRIMARY CARE CLINIC | Facility: CLINIC | Age: 28
End: 2023-09-05
Payer: MEDICAID

## 2023-09-05 NOTE — TELEPHONE ENCOUNTER
Pt informed he will be picking up the Basaglar kwik pen due to pharmacy stating his ins will not cover even though that was what he ins had told office--per Dr Paul cox to use the Basaglar since his ins will cover med --co-pay is $35.00--pt informed pharmacy is working on filling his rx and he can  later today

## 2023-10-25 ENCOUNTER — TELEPHONE (OUTPATIENT)
Dept: PRIMARY CARE CLINIC | Facility: CLINIC | Age: 28
End: 2023-10-25
Payer: MEDICAID

## 2023-10-25 NOTE — TELEPHONE ENCOUNTER
Patient  was transferred to my line. Patient states he took creatine 2 days ago and now has a blood vessel that he thinks may have burst. This is causing is his vision to be blurred. Went consult with provider, she recommends for pt to be evaluated at the local ER or urgent care. Pt gave a verbalized understanding.

## 2023-10-31 ENCOUNTER — OFFICE VISIT (OUTPATIENT)
Dept: PRIMARY CARE CLINIC | Facility: CLINIC | Age: 28
End: 2023-10-31
Payer: MEDICAID

## 2023-10-31 ENCOUNTER — PATIENT MESSAGE (OUTPATIENT)
Dept: ADMINISTRATIVE | Facility: HOSPITAL | Age: 28
End: 2023-10-31
Payer: MEDICAID

## 2023-10-31 VITALS
BODY MASS INDEX: 23.34 KG/M2 | DIASTOLIC BLOOD PRESSURE: 94 MMHG | OXYGEN SATURATION: 99 % | HEIGHT: 68 IN | WEIGHT: 154 LBS | SYSTOLIC BLOOD PRESSURE: 155 MMHG | HEART RATE: 79 BPM

## 2023-10-31 DIAGNOSIS — E10.69 TYPE 1 DIABETES MELLITUS WITH OTHER SPECIFIED COMPLICATION: Primary | ICD-10-CM

## 2023-10-31 DIAGNOSIS — D64.9 ANEMIA, UNSPECIFIED TYPE: ICD-10-CM

## 2023-10-31 DIAGNOSIS — R73.09 ABNORMAL BLOOD SUGAR: ICD-10-CM

## 2023-10-31 PROCEDURE — 3008F PR BODY MASS INDEX (BMI) DOCUMENTED: ICD-10-PCS | Mod: CPTII,S$GLB,, | Performed by: INTERNAL MEDICINE

## 2023-10-31 PROCEDURE — 3080F PR MOST RECENT DIASTOLIC BLOOD PRESSURE >= 90 MM HG: ICD-10-PCS | Mod: CPTII,S$GLB,, | Performed by: INTERNAL MEDICINE

## 2023-10-31 PROCEDURE — 99214 PR OFFICE/OUTPT VISIT, EST, LEVL IV, 30-39 MIN: ICD-10-PCS | Mod: S$GLB,,, | Performed by: INTERNAL MEDICINE

## 2023-10-31 PROCEDURE — 99214 OFFICE O/P EST MOD 30 MIN: CPT | Mod: S$GLB,,, | Performed by: INTERNAL MEDICINE

## 2023-10-31 PROCEDURE — 3077F PR MOST RECENT SYSTOLIC BLOOD PRESSURE >= 140 MM HG: ICD-10-PCS | Mod: CPTII,S$GLB,, | Performed by: INTERNAL MEDICINE

## 2023-10-31 PROCEDURE — 3080F DIAST BP >= 90 MM HG: CPT | Mod: CPTII,S$GLB,, | Performed by: INTERNAL MEDICINE

## 2023-10-31 PROCEDURE — 3008F BODY MASS INDEX DOCD: CPT | Mod: CPTII,S$GLB,, | Performed by: INTERNAL MEDICINE

## 2023-10-31 PROCEDURE — 3077F SYST BP >= 140 MM HG: CPT | Mod: CPTII,S$GLB,, | Performed by: INTERNAL MEDICINE

## 2023-10-31 RX ORDER — INSULIN PUMP SYRINGE, 3 ML
EACH MISCELLANEOUS
Qty: 1 EACH | Refills: 0 | Status: SHIPPED | OUTPATIENT
Start: 2023-10-31 | End: 2024-10-30

## 2023-10-31 RX ORDER — LANCETS
EACH MISCELLANEOUS
Qty: 200 EACH | Refills: 0 | Status: SHIPPED | OUTPATIENT
Start: 2023-10-31

## 2023-10-31 NOTE — PROGRESS NOTES
"Subjective:      Patient ID: Modesto Nguyễn is a 28 y.o. male.    Chief Complaint: Follow-up (He states he is a little stressed, "always."  He did go Eye clinic on last week. Sesar/Michael/Cristian? Eye MD advised he wants to test him to see if he may have sickle cell. He states the MD advised him from the way his eye occurred it may be indicative of sickle cell. "I have not been consistent with my BS levels. )    HPI    Past Medical History:   Diagnosis Date    COVID     Type 1 diabetes mellitus      Patient with poorly controlled DM1 presents for follow up. He is agreeable to a CGM and also wanting to start an insulin pump        Review of Systems   Constitutional:  Negative for chills and fever.   HENT:  Negative for hearing loss.    Eyes:  Negative for blurred vision.   Respiratory:  Negative for cough, shortness of breath and wheezing.    Cardiovascular:  Negative for chest pain, palpitations and leg swelling.   Gastrointestinal:  Negative for abdominal pain, blood in stool, constipation, diarrhea, melena, nausea and vomiting.   Genitourinary:  Negative for dysuria, frequency and urgency.   Musculoskeletal:  Negative for falls.   Skin:  Negative for rash.   Neurological:  Negative for dizziness and headaches.   Endo/Heme/Allergies:  Does not bruise/bleed easily.   Psychiatric/Behavioral:  Negative for depression. The patient is not nervous/anxious.      Objective:     Physical Exam  Vitals reviewed.   Constitutional:       Appearance: Normal appearance.   HENT:      Head: Normocephalic.      Mouth/Throat:      Mouth: Mucous membranes are moist.      Pharynx: Oropharynx is clear.   Eyes:      Extraocular Movements: Extraocular movements intact.      Conjunctiva/sclera: Conjunctivae normal.      Pupils: Pupils are equal, round, and reactive to light.   Cardiovascular:      Rate and Rhythm: Normal rate and regular rhythm.   Pulmonary:      Effort: Pulmonary effort is normal.      Breath sounds: Normal breath sounds. " "  Abdominal:      General: Bowel sounds are normal.   Musculoskeletal:      Right lower leg: No edema.      Left lower leg: No edema.   Skin:     General: Skin is warm.      Capillary Refill: Capillary refill takes less than 2 seconds.   Neurological:      Mental Status: He is alert and oriented to person, place, and time.   Psychiatric:         Mood and Affect: Mood normal.       BP (!) 155/94 (BP Location: Right arm, Patient Position: Sitting, BP Method: Medium (Automatic))   Pulse 79   Ht 5' 8" (1.727 m)   Wt 69.9 kg (154 lb)   SpO2 99%   BMI 23.42 kg/m²     Assessment:       ICD-10-CM ICD-9-CM   1. Type 1 diabetes mellitus with other specified complication  E10.69 250.81   2. Abnormal blood sugar  R73.09 790.29   3. Anemia, unspecified type  D64.9 285.9       Plan:     Medication List with Changes/Refills   New Medications    BLOOD SUGAR DIAGNOSTIC STRP    To check BG 2 times daily, to use with insurance preferred meter    BLOOD-GLUCOSE METER KIT    To check BG 2 times daily, to use with insurance preferred meter    LANCETS MISC    To check BG 2 times daily, to use with insurance preferred meter   Current Medications    INSULIN (LANTUS SOLOSTAR U-100 INSULIN) GLARGINE 100 UNITS/ML SUBQ PEN    Inject 25 Units into the skin once daily.    NOVOLOG FLEXPEN U-100 INSULIN 100 UNIT/ML (3 ML) INPN PEN    INJECT 12 UNITS SUBCUTANEOUS THREE TIMES DAILY WITH MEALS   Discontinued Medications    BLOOD SUGAR DIAGNOSTIC (TRUE METRIX GLUCOSE TEST STRIP) STRP    1 strip by Misc.(Non-Drug; Combo Route) route 3 (three) times daily. Use monitor and strips to check blood sugar three times daily.    BLOOD-GLUCOSE METER MISC    1 Device by Misc.(Non-Drug; Combo Route) route once. Please give patient Riaz metrix  monitor to check sugar three times daily. for 1 dose    LANCETS (LANCETS,ULTRA THIN) 26 GAUGE MISC    1 lancet by Misc.(Non-Drug; Combo Route) route 3 (three) times daily. Please give patient lancets and lancing device to " "check sugars three times daily.    PEN NEEDLE, DIABETIC (BD TUSHAR 2ND GEN PEN NEEDLE) 32 GAUGE X 5/32" NDLE    USE AS DIRECTED 5 TIMES DAILY        1. Type 1 diabetes mellitus with other specified complication  -     blood-glucose meter kit; To check BG 2 times daily, to use with insurance preferred meter  Dispense: 1 each; Refill: 0  -     lancets Misc; To check BG 2 times daily, to use with insurance preferred meter  Dispense: 200 each; Refill: 0  -     blood sugar diagnostic Strp; To check BG 2 times daily, to use with insurance preferred meter  Dispense: 200 each; Refill: 0  -     Hemoglobin A1C; Future; Expected date: 10/31/2023  -     Microalbumin/Creatinine Ratio, urine    2. Abnormal blood sugar  -     POCT Glucose, Hand-Held Device  -     blood-glucose meter kit; To check BG 2 times daily, to use with insurance preferred meter  Dispense: 1 each; Refill: 0  -     lancets Misc; To check BG 2 times daily, to use with insurance preferred meter  Dispense: 200 each; Refill: 0  -     blood sugar diagnostic Strp; To check BG 2 times daily, to use with insurance preferred meter  Dispense: 200 each; Refill: 0    3. Anemia, unspecified type  -     ELECTROPHORESIS HGB; Future; Expected date: 10/31/2023         Temporary CGM applied, will return in 10 days to drop off readings. Will need omnipod training once approved by insurance    F/u in 3 months                    "

## 2023-11-01 RX ORDER — INSULIN PMP CART,AUT,G6/7,CNTR
1 EACH SUBCUTANEOUS CONTINUOUS
Qty: 1 EACH | Refills: 0 | Status: SHIPPED | OUTPATIENT
Start: 2023-11-01 | End: 2024-01-30 | Stop reason: SDUPTHER

## 2023-11-01 RX ORDER — INSULIN PMP CART,AUT,G6/7,CNTR
1 EACH SUBCUTANEOUS
Qty: 40 EACH | Refills: 3 | Status: SHIPPED | OUTPATIENT
Start: 2023-11-01 | End: 2024-01-30 | Stop reason: SDUPTHER

## 2023-11-09 ENCOUNTER — TELEPHONE (OUTPATIENT)
Dept: PRIMARY CARE CLINIC | Facility: CLINIC | Age: 28
End: 2023-11-09

## 2023-11-09 NOTE — TELEPHONE ENCOUNTER
Returned call to patient, he states his temp DexCom fell off on 11/06/23 and further states he had fever and was sweating a lot. Patient was advised that his PA is pending due to needing updated insurance cards front and back. Patient will come to clinic so I can make a copy.

## 2023-11-09 NOTE — TELEPHONE ENCOUNTER
----- Message from Lorri Camara MA sent at 11/9/2023 12:50 PM CST -----  Contact: self    ----- Message -----  From: Nicol Miller MD  Sent: 11/8/2023   8:17 AM CST  To: Nikki Siegel MA; Lorri Camara MA    Please call him Nikki    ----- Message -----  From: Lorri Camara MA  Sent: 11/7/2023   3:01 PM CST  To: Nicol Miller MD    Called and spoke with patient and he stated that' he was just on a trial dexcom but it came off and doesn't no what to do now.  ----- Message -----  From: Stella Perez  Sent: 11/7/2023   2:48 PM CST  To: Paul Camarena Staff    Patient Modesto Nguyễn is requesting a call back regarding his dexcom machine - it came off of him yesterday at work. Please call back at [phone].

## 2023-11-16 ENCOUNTER — TELEPHONE (OUTPATIENT)
Dept: PRIMARY CARE CLINIC | Facility: CLINIC | Age: 28
End: 2023-11-16
Payer: MEDICAID

## 2023-11-16 NOTE — TELEPHONE ENCOUNTER
Pt advised he can still come by and get his labs drawn. Pt verbalized understanding.     NO answer and no voicemail.     ----- Message from Stella Perez sent at 11/16/2023 11:47 AM CST -----  Contact: self  Patient Modesto Nguyễn is requesting a call back regarding orders for fasting blood work. It's old and he wanted to know if he needed to still go. It was about 3 weeks ago. Please call back at [phone].

## 2023-12-06 ENCOUNTER — TELEPHONE (OUTPATIENT)
Dept: PRIMARY CARE CLINIC | Facility: CLINIC | Age: 28
End: 2023-12-06
Payer: MEDICAID

## 2023-12-06 ENCOUNTER — PATIENT MESSAGE (OUTPATIENT)
Dept: PRIMARY CARE CLINIC | Facility: CLINIC | Age: 28
End: 2023-12-06
Payer: MEDICAID

## 2023-12-06 NOTE — TELEPHONE ENCOUNTER
----- Message from Monalisa Chapman LPN sent at 12/5/2023  4:44 PM CST -----  Contact: Modesto    ----- Message -----  From: Elena See  Sent: 12/5/2023  12:11 PM CST  To: Paul Camarena Staff    Patient is calling to speak to the nurse regarding orders. Reports needing some bloodwork done. Please give patient a call back at .[phone]  Thanks  JL

## 2023-12-21 ENCOUNTER — TELEPHONE (OUTPATIENT)
Dept: PRIMARY CARE CLINIC | Facility: CLINIC | Age: 28
End: 2023-12-21
Payer: MEDICAID

## 2023-12-21 NOTE — TELEPHONE ENCOUNTER
----- Message from Tanyachris Rodriguez sent at 12/21/2023  2:47 PM CST -----  Contact: Patient  Patient called to consult with nurse or staff regarding his medication. He states he is needing a refill on the Basaglar Kwikpin and states he also needs the needles that come with it. He wanted to see if this medication can be filled at   [facility]/pharmacy #[id number] - [place], [place] - [address]  [address] [address]  Phone: [phone] Fax: [phone]  Patient would like a call back and can be reached at [phone]. Thanks/MR

## 2023-12-26 DIAGNOSIS — E10.69 TYPE 1 DIABETES MELLITUS WITH OTHER SPECIFIED COMPLICATION: Primary | ICD-10-CM

## 2023-12-26 RX ORDER — PEN NEEDLE, DIABETIC 30 GX3/16"
1 NEEDLE, DISPOSABLE MISCELLANEOUS 4 TIMES DAILY
Qty: 200 EACH | Refills: 3 | Status: SHIPPED | OUTPATIENT
Start: 2023-12-26

## 2023-12-26 RX ORDER — INSULIN GLARGINE 100 [IU]/ML
25 INJECTION, SOLUTION SUBCUTANEOUS DAILY
Qty: 7 EACH | Refills: 3 | Status: SHIPPED | OUTPATIENT
Start: 2023-12-26 | End: 2024-01-25

## 2023-12-27 ENCOUNTER — PATIENT MESSAGE (OUTPATIENT)
Dept: PRIMARY CARE CLINIC | Facility: CLINIC | Age: 28
End: 2023-12-27
Payer: MEDICAID

## 2024-01-30 ENCOUNTER — CLINICAL SUPPORT (OUTPATIENT)
Dept: OBSTETRICS AND GYNECOLOGY | Facility: CLINIC | Age: 29
End: 2024-01-30
Payer: MEDICAID

## 2024-01-30 ENCOUNTER — OFFICE VISIT (OUTPATIENT)
Dept: PRIMARY CARE CLINIC | Facility: CLINIC | Age: 29
End: 2024-01-30
Payer: MEDICAID

## 2024-01-30 ENCOUNTER — PATIENT MESSAGE (OUTPATIENT)
Dept: ADMINISTRATIVE | Facility: HOSPITAL | Age: 29
End: 2024-01-30
Payer: MEDICAID

## 2024-01-30 VITALS
HEART RATE: 86 BPM | WEIGHT: 154 LBS | SYSTOLIC BLOOD PRESSURE: 135 MMHG | DIASTOLIC BLOOD PRESSURE: 87 MMHG | BODY MASS INDEX: 23.42 KG/M2 | OXYGEN SATURATION: 98 %

## 2024-01-30 DIAGNOSIS — Z01.89 ROUTINE LAB DRAW: Primary | ICD-10-CM

## 2024-01-30 DIAGNOSIS — E10.69 TYPE 1 DIABETES MELLITUS WITH OTHER SPECIFIED COMPLICATION: Primary | ICD-10-CM

## 2024-01-30 DIAGNOSIS — Z28.21 VACCINATION REFUSED BY PATIENT: ICD-10-CM

## 2024-01-30 DIAGNOSIS — E10.69 TYPE 1 DIABETES MELLITUS WITH OTHER SPECIFIED COMPLICATION: ICD-10-CM

## 2024-01-30 LAB
CREATININE, URINE: 125.6 MG/DL (ref 10–175)
ESTIMATED AVG GLUCOSE: 279 MG/DL
HBA1C MFR BLD: 10 % (ref 4.2–6.3)
MICROALBUMIN URINE: 676.9 MG/L (ref 0–20)
MICROALBUMIN/CREATININE RATIO: 538 MG/G (ref 0–30)

## 2024-01-30 PROCEDURE — 3008F BODY MASS INDEX DOCD: CPT | Mod: CPTII,S$GLB,, | Performed by: INTERNAL MEDICINE

## 2024-01-30 PROCEDURE — 3075F SYST BP GE 130 - 139MM HG: CPT | Mod: CPTII,S$GLB,, | Performed by: INTERNAL MEDICINE

## 2024-01-30 PROCEDURE — 36415 COLL VENOUS BLD VENIPUNCTURE: CPT | Mod: S$GLB,,, | Performed by: INTERNAL MEDICINE

## 2024-01-30 PROCEDURE — 99212 OFFICE O/P EST SF 10 MIN: CPT | Mod: S$GLB,,, | Performed by: INTERNAL MEDICINE

## 2024-01-30 PROCEDURE — 1159F MED LIST DOCD IN RCRD: CPT | Mod: CPTII,S$GLB,, | Performed by: INTERNAL MEDICINE

## 2024-01-30 PROCEDURE — 3079F DIAST BP 80-89 MM HG: CPT | Mod: CPTII,S$GLB,, | Performed by: INTERNAL MEDICINE

## 2024-01-30 RX ORDER — BLOOD-GLUCOSE TRANSMITTER
EACH MISCELLANEOUS
COMMUNITY
Start: 2023-11-16

## 2024-01-30 RX ORDER — INSULIN PMP CART,AUT,G6/7,CNTR
1 EACH SUBCUTANEOUS
Qty: 40 EACH | Refills: 3 | Status: SHIPPED | OUTPATIENT
Start: 2024-01-30 | End: 2024-04-30 | Stop reason: SDUPTHER

## 2024-01-30 RX ORDER — INSULIN PMP CART,AUT,G6/7,CNTR
1 EACH SUBCUTANEOUS CONTINUOUS
Qty: 1 EACH | Refills: 0 | Status: SHIPPED | OUTPATIENT
Start: 2024-01-30 | End: 2025-01-29

## 2024-01-30 RX ORDER — BLOOD-GLUCOSE,RECEIVER,CONT
EACH MISCELLANEOUS
COMMUNITY
Start: 2023-11-16

## 2024-01-30 RX ORDER — BLOOD-GLUCOSE SENSOR
EACH MISCELLANEOUS
COMMUNITY
Start: 2023-11-16 | End: 2024-04-30 | Stop reason: SDUPTHER

## 2024-01-30 RX ORDER — LANCETS 33 GAUGE
EACH MISCELLANEOUS
COMMUNITY
Start: 2023-11-02

## 2024-01-30 NOTE — PROGRESS NOTES
Subjective:      Patient ID: Modesto Nguyễn is a 28 y.o. male.    Chief Complaint: Follow-up (3mn; trouble connecting dexcom to bluetooth; Needs another DEXCOM transmitter sent.)    HPI    Past Medical History:   Diagnosis Date    COVID     Type 1 diabetes mellitus      Previous visit paperwork was sent to get patient a CGM and Dexcom, will need a new order today with PA. He reports some hypoglycemic episodes when he gives himself novolog. I did give him a sliding scale but he states he checks his blood glucose sporadically.   Last visit he also stated that his ophthalmologist wanted to see if he had sickle cell disease and hgb electrophoresis was ordered which he did not do        Review of Systems   Constitutional:  Negative for chills and fever.   HENT:  Negative for hearing loss.    Eyes:  Negative for blurred vision.   Respiratory:  Negative for cough, shortness of breath and wheezing.    Cardiovascular:  Negative for chest pain, palpitations and leg swelling.   Gastrointestinal:  Negative for abdominal pain, blood in stool, constipation, diarrhea, melena, nausea and vomiting.   Genitourinary:  Negative for dysuria, frequency and urgency.   Musculoskeletal:  Negative for falls.   Skin:  Negative for rash.   Neurological:  Negative for dizziness and headaches.   Endo/Heme/Allergies:  Does not bruise/bleed easily.   Psychiatric/Behavioral:  Negative for depression. The patient is not nervous/anxious.      Objective:     Physical Exam  Vitals reviewed.   Constitutional:       Appearance: Normal appearance.   HENT:      Head: Normocephalic.      Mouth/Throat:      Mouth: Mucous membranes are moist.      Pharynx: Oropharynx is clear.   Eyes:      Extraocular Movements: Extraocular movements intact.      Conjunctiva/sclera: Conjunctivae normal.      Pupils: Pupils are equal, round, and reactive to light.   Musculoskeletal:      Right lower leg: No edema.      Left lower leg: No edema.   Skin:     General: Skin is  "warm.      Capillary Refill: Capillary refill takes less than 2 seconds.   Neurological:      Mental Status: He is alert and oriented to person, place, and time.   Psychiatric:         Mood and Affect: Mood normal.       /87   Pulse 86   Wt 69.9 kg (154 lb)   SpO2 98%   BMI 23.42 kg/m²     Assessment:       ICD-10-CM ICD-9-CM   1. Type 1 diabetes mellitus with other specified complication  E10.69 250.81   2. Vaccination refused by patient  Z28.21 V64.06       Plan:     Medication List with Changes/Refills   Current Medications    BLOOD SUGAR DIAGNOSTIC STRP    To check BG 2 times daily, to use with insurance preferred meter    BLOOD-GLUCOSE METER KIT    To check BG 2 times daily, to use with insurance preferred meter    DEXCOM G6  MISC        DEXCOM G6 SENSOR RAGINI        DEXCOM G6 TRANSMITTER RAGINI        INSULIN (BASAGLAR KWIKPEN U-100 INSULIN) GLARGINE 100 UNITS/ML SUBQ PEN    Inject 25 Units into the skin once daily.    LANCETS MISC    To check BG 2 times daily, to use with insurance preferred meter    NOVOLOG FLEXPEN U-100 INSULIN 100 UNIT/ML (3 ML) INPN PEN    INJECT 12 UNITS SUBCUTANEOUS THREE TIMES DAILY WITH MEALS    ONETOUCH DELICA PLUS LANCET 33 GAUGE MISC    CHECK BLOOD SUGAR TWICE A DAY    PEN NEEDLE, DIABETIC 31 GAUGE X 5/16" NDLE    1 each by Misc.(Non-Drug; Combo Route) route 4 (four) times daily.   Changed and/or Refilled Medications    Modified Medication Previous Medication    INSULIN PUMP CART,AUTO,BT-CNTR (OMNIPOD 5 G6 INTRO KIT, GEN 5,) CRTG insulin pump cart,auto,BT-cntr (OMNIPOD 5 G6 INTRO KIT, GEN 5,) Crtg       Inject 1 kit into the skin continuous.    Inject 1 kit into the skin continuous.    INSULIN PUMP CART,AUTOMATED,BT (OMNIPOD 5 G6 PODS, GEN 5,) CRTG insulin pump cart,automated,BT (OMNIPOD 5 G6 PODS, GEN 5,) Crtg       Inject 1 Cartridge into the skin every 72 hours.    Inject 1 Cartridge into the skin every 72 hours.        1. Type 1 diabetes mellitus with other " specified complication    2. Vaccination refused by patient       Dexcom and omnipod again ordered, patient informed to contact us so he can schedule a training     Future Appointments   Date Time Provider Department Center   4/30/2024  2:40 PM Nicol Miller MD [facility]       Labs ordered previously printed out and provided to the patient

## 2024-03-05 ENCOUNTER — TELEPHONE (OUTPATIENT)
Dept: PRIMARY CARE CLINIC | Facility: CLINIC | Age: 29
End: 2024-03-05
Payer: MEDICAID

## 2024-03-05 NOTE — TELEPHONE ENCOUNTER
The patient is advised of his A1C and he is waiting on his training for the cgm.     ----- Message from Nikole Sood sent at 3/5/2024 11:39 AM CST -----  Contact: self  Patient is requesting a call back regarding a letter received in the mail asking him to call and ask for Chastity. Please call back at [phone]

## 2024-03-13 DIAGNOSIS — E10.69 TYPE 1 DIABETES MELLITUS WITH OTHER SPECIFIED COMPLICATION: ICD-10-CM

## 2024-03-13 RX ORDER — INSULIN ASPART 100 [IU]/ML
INJECTION, SOLUTION INTRAVENOUS; SUBCUTANEOUS
Qty: 99 ML | Refills: 3 | Status: SHIPPED | OUTPATIENT
Start: 2024-03-13 | End: 2024-03-14

## 2024-03-13 NOTE — TELEPHONE ENCOUNTER
S/w patient and he states he is completely out of novolog and needs a refill. Advised patient to keep appt for OmniPod training and also his upcoming appt with you as well. Patient verbalized understanding.

## 2024-03-14 RX ORDER — INSULIN DEGLUDEC 200 U/ML
INJECTION, SOLUTION SUBCUTANEOUS
Qty: 6 PEN | Refills: 3 | Status: SHIPPED | OUTPATIENT
Start: 2024-03-14

## 2024-03-15 ENCOUNTER — TELEPHONE (OUTPATIENT)
Dept: PRIMARY CARE CLINIC | Facility: CLINIC | Age: 29
End: 2024-03-15
Payer: MEDICAID

## 2024-03-15 NOTE — TELEPHONE ENCOUNTER
Spoke to pt's ins- [employer] [place] [employer]-med covered is the INSULIN DEGLUDEC  THE BRAND NAME TRESIBA FLEXTOUCH IS NOT COVERED

## 2024-03-15 NOTE — TELEPHONE ENCOUNTER
PER HIS INS HIS NOVOLOG PEN IS COVERED BY HIS MEDICAID INS SO PHARMACY WILL FILL HIS MED THROUGH HIS MEDICAID

## 2024-03-15 NOTE — TELEPHONE ENCOUNTER
PT STATES HE NEEDS REFILL ON HIS NOVOLOG FLEX PEN USING A SLIDING SCALE--HE HAS BEEN OUT OF MED FOR A FEW DAYS

## 2024-04-30 ENCOUNTER — OFFICE VISIT (OUTPATIENT)
Dept: PRIMARY CARE CLINIC | Facility: CLINIC | Age: 29
End: 2024-04-30
Payer: MEDICAID

## 2024-04-30 VITALS
WEIGHT: 156.38 LBS | SYSTOLIC BLOOD PRESSURE: 142 MMHG | BODY MASS INDEX: 23.7 KG/M2 | HEIGHT: 68 IN | DIASTOLIC BLOOD PRESSURE: 90 MMHG | HEART RATE: 92 BPM | OXYGEN SATURATION: 98 %

## 2024-04-30 DIAGNOSIS — E10.69 TYPE 1 DIABETES MELLITUS WITH OTHER SPECIFIED COMPLICATION: Primary | ICD-10-CM

## 2024-04-30 LAB — HBA1C MFR BLD: 7.8 % (ref 4–6)

## 2024-04-30 PROCEDURE — 3077F SYST BP >= 140 MM HG: CPT | Mod: CPTII,S$GLB,, | Performed by: INTERNAL MEDICINE

## 2024-04-30 PROCEDURE — 3046F HEMOGLOBIN A1C LEVEL >9.0%: CPT | Mod: CPTII,S$GLB,, | Performed by: INTERNAL MEDICINE

## 2024-04-30 PROCEDURE — 3008F BODY MASS INDEX DOCD: CPT | Mod: CPTII,S$GLB,, | Performed by: INTERNAL MEDICINE

## 2024-04-30 PROCEDURE — 99214 OFFICE O/P EST MOD 30 MIN: CPT | Mod: S$GLB,,, | Performed by: INTERNAL MEDICINE

## 2024-04-30 PROCEDURE — 3066F NEPHROPATHY DOC TX: CPT | Mod: CPTII,S$GLB,, | Performed by: INTERNAL MEDICINE

## 2024-04-30 PROCEDURE — 3080F DIAST BP >= 90 MM HG: CPT | Mod: CPTII,S$GLB,, | Performed by: INTERNAL MEDICINE

## 2024-04-30 PROCEDURE — 3062F POS MACROALBUMINURIA REV: CPT | Mod: CPTII,S$GLB,, | Performed by: INTERNAL MEDICINE

## 2024-04-30 NOTE — PROGRESS NOTES
Subjective:      Patient ID: Modesto Nguyễn is a 29 y.o. male.    Chief Complaint: Follow-up    HPI    Past Medical History:   Diagnosis Date    COVID     Type 1 diabetes mellitus      Patient with type 1 DM recently aquired and has been trained for omnipod. He is using his omnipod but is still new at it and he states he ran out of his dexcom sensors so is trying to put in the number sin omnipod. I was able to download both the omnipod and dexcom readings. Will scan into chart.   A1c due today. He states he has some lows but that is when he doesn't eat or he exercises too much   He states he googles the carb count of his meals and enters the data. Sometimes he won't be able to finish the carbs. He states the bolus insulin is not bringing his blood glucose down    Review of Systems   Constitutional:  Negative for chills and fever.   HENT:  Negative for hearing loss.    Eyes:  Negative for blurred vision.   Respiratory:  Negative for cough, shortness of breath and wheezing.    Cardiovascular:  Negative for chest pain, palpitations and leg swelling.   Gastrointestinal:  Negative for abdominal pain, blood in stool, constipation, diarrhea, melena, nausea and vomiting.   Genitourinary:  Negative for dysuria, frequency and urgency.   Musculoskeletal:  Negative for falls.   Skin:  Negative for rash.   Neurological:  Negative for dizziness and headaches.   Endo/Heme/Allergies:  Does not bruise/bleed easily.   Psychiatric/Behavioral:  Negative for depression. The patient is not nervous/anxious.      Objective:     Physical Exam  Vitals reviewed.   Constitutional:       Appearance: Normal appearance.   HENT:      Head: Normocephalic.      Mouth/Throat:      Mouth: Mucous membranes are moist.      Pharynx: Oropharynx is clear.   Eyes:      Extraocular Movements: Extraocular movements intact.      Conjunctiva/sclera: Conjunctivae normal.      Pupils: Pupils are equal, round, and reactive to light.   Cardiovascular:      Rate and  "Rhythm: Normal rate and regular rhythm.   Pulmonary:      Effort: Pulmonary effort is normal.      Breath sounds: Normal breath sounds.   Abdominal:      General: Bowel sounds are normal.   Musculoskeletal:      Right lower leg: No edema.      Left lower leg: No edema.   Skin:     General: Skin is warm.      Capillary Refill: Capillary refill takes less than 2 seconds.   Neurological:      Mental Status: He is alert and oriented to person, place, and time.   Psychiatric:         Mood and Affect: Mood normal.       BP (!) 142/90 (BP Location: Right arm, Patient Position: Sitting, BP Method: Large (Automatic))   Pulse 92   Ht 5' 8" (1.727 m)   Wt 70.9 kg (156 lb 6.4 oz)   SpO2 98%   BMI 23.78 kg/m²     Assessment:       ICD-10-CM ICD-9-CM   1. Type 1 diabetes mellitus with other specified complication  E10.69 250.81       Plan:     Medication List with Changes/Refills   Current Medications    BLOOD SUGAR DIAGNOSTIC STRP    To check BG 2 times daily, to use with insurance preferred meter    BLOOD-GLUCOSE METER KIT    To check BG 2 times daily, to use with insurance preferred meter    DEXCOM G6  MISC        DEXCOM G6 TRANSMITTER RAGINI        INSULIN (BASAGLAR KWIKPEN U-100 INSULIN) GLARGINE 100 UNITS/ML SUBQ PEN    Inject 25 Units into the skin once daily.    INSULIN DEGLUDEC (TRESIBA FLEXTOUCH U-200) 200 UNIT/ML (3 ML) INSULIN PEN        INSULIN PUMP CART,AUTO,BT-CNTR (OMNIPOD 5 G6 INTRO KIT, GEN 5,) CRTG    Inject 1 kit into the skin continuous.    LANCETS MISC    To check BG 2 times daily, to use with insurance preferred meter    ONETOUCH DELICA PLUS LANCET 33 GAUGE MISC    CHECK BLOOD SUGAR TWICE A DAY    PEN NEEDLE, DIABETIC 31 GAUGE X 5/16" NDLE    1 each by Misc.(Non-Drug; Combo Route) route 4 (four) times daily.   Changed and/or Refilled Medications    Modified Medication Previous Medication    DEXCOM G6 SENSOR RAGINI DEXCOM G6 SENSOR Ragini       INJECT 1 EACH INTO THE SKIN EVERY 10 DAYS.        OMNIPOD " 5 G6 PODS, GEN 5, CRTG insulin pump cart,automated,BT (OMNIPOD 5 G6 PODS, GEN 5,) Crtg       INJECT 1 CARTRIDGE INTO THE SKIN EVERY 72 HOURS.    Inject 1 Cartridge into the skin every 72 hours.        1. Type 1 diabetes mellitus with other specified complication  -     Hemoglobin A1C; Future; Expected date: 04/30/2024  -     Discontinue: DEXCOM G6 SENSOR Jody; Inject 1 each into the skin every 10 days.  Dispense: 3 each; Refill: 11  -     Discontinue: insulin pump cart,automated,BT (OMNIPOD 5 G6 PODS, GEN 5,) Crtg; Inject 1 Cartridge into the skin every 72 hours.  Dispense: 2 each; Refill: 11         I have reviewed his settings, will increase correction factor of the carbs so he doesn't get hypoglycemic. Increase to 60. I have been trying to contact Omniopod instructor Stella Hughes. I think he still needs a refresher course in how to use it      Future Appointments   Date Time Provider Department Center   8/8/2024  2:00 PM Nicol Miller MD [facility]

## 2024-05-02 DIAGNOSIS — E10.69 TYPE 1 DIABETES MELLITUS WITH OTHER SPECIFIED COMPLICATION: ICD-10-CM

## 2024-05-02 RX ORDER — BLOOD-GLUCOSE SENSOR
1 EACH MISCELLANEOUS
Qty: 3 EACH | Refills: 11 | Status: SHIPPED | OUTPATIENT
Start: 2024-05-02 | End: 2024-05-02

## 2024-05-02 RX ORDER — BLOOD-GLUCOSE SENSOR
1 EACH MISCELLANEOUS
Qty: 3 EACH | Refills: 11 | Status: SHIPPED | OUTPATIENT
Start: 2024-05-02 | End: 2025-05-02

## 2024-05-02 RX ORDER — INSULIN PMP CART,AUT,G6/7,CNTR
1 EACH SUBCUTANEOUS
Qty: 10 EACH | Refills: 11 | Status: SHIPPED | OUTPATIENT
Start: 2024-05-02 | End: 2025-05-02

## 2024-05-02 RX ORDER — INSULIN PMP CART,AUT,G6/7,CNTR
1 EACH SUBCUTANEOUS
Qty: 2 EACH | Refills: 11 | Status: SHIPPED | OUTPATIENT
Start: 2024-05-02 | End: 2024-05-02

## 2024-05-08 ENCOUNTER — PATIENT OUTREACH (OUTPATIENT)
Dept: ADMINISTRATIVE | Facility: HOSPITAL | Age: 29
End: 2024-05-08
Payer: MEDICAID

## 2024-05-14 ENCOUNTER — TELEPHONE (OUTPATIENT)
Dept: PRIMARY CARE CLINIC | Facility: CLINIC | Age: 29
End: 2024-05-14
Payer: MEDICAID

## 2024-05-14 ENCOUNTER — PATIENT MESSAGE (OUTPATIENT)
Dept: PRIMARY CARE CLINIC | Facility: CLINIC | Age: 29
End: 2024-05-14
Payer: MEDICAID

## 2024-05-14 NOTE — TELEPHONE ENCOUNTER
I think you started working on this right?     ----- Message from Luann Hall sent at 5/14/2024  9:42 AM CDT -----  Patient is calling in regards to waiting for prior authorization for medication DEXCOM G6 SENSOR Jody please call him back at [phone]

## 2024-05-22 ENCOUNTER — TELEPHONE (OUTPATIENT)
Dept: PRIMARY CARE CLINIC | Facility: CLINIC | Age: 29
End: 2024-05-22
Payer: MEDICAID

## 2024-05-22 NOTE — TELEPHONE ENCOUNTER
----- Message from Nikole Sood sent at 5/22/2024  9:39 AM CDT -----  Contact: self  Patient is requesting a call back regarding DEXCOM G6 SENSOR Jody - all censors were having errors, he is out now because he kept changing censors and they didn't work. Pt needs more sent out. Please call back at [phone]       [id number] - [place] - [address] [address]  Phone: [phone] Fax: [phone]

## 2024-05-22 NOTE — TELEPHONE ENCOUNTER
S/w patient and advised to contact DexCom product support line to replace sensors. Gave phone number [phone]. Offered patient a DexCom G7 sample, patient is aware it will not link to his Omni Pod pump and will need to be in manual mode but he will still have continue glucose readings to prevent severe lows/highs. Patient agreed and will come by to get sample.

## 2024-06-18 ENCOUNTER — TELEPHONE (OUTPATIENT)
Dept: PRIMARY CARE CLINIC | Facility: CLINIC | Age: 29
End: 2024-06-18
Payer: MEDICAID

## 2024-06-18 NOTE — TELEPHONE ENCOUNTER
Called Emelia Sanchez with Dexcom to advise the patient. She will give the patient a call on my behalf.     ----- Message from Tanya Rodriguez sent at 6/18/2024 11:57 AM CDT -----  Contact: Patient  Patient called to consult with nurse or staff regarding his Dexcomm G6 sensor. He states that he has called the  for troubleshooting and is still having a problem with getting it connected at times. He wanted to speak with clinic to see if her can be switched to the G7 and would like a call back. He can be reached at  [phone]. Thanks/MR

## 2024-07-18 ENCOUNTER — OFFICE VISIT (OUTPATIENT)
Dept: PRIMARY CARE CLINIC | Facility: CLINIC | Age: 29
End: 2024-07-18
Payer: MEDICAID

## 2024-07-18 VITALS
BODY MASS INDEX: 23.97 KG/M2 | WEIGHT: 158.19 LBS | HEIGHT: 68 IN | DIASTOLIC BLOOD PRESSURE: 84 MMHG | SYSTOLIC BLOOD PRESSURE: 134 MMHG | OXYGEN SATURATION: 99 % | HEART RATE: 88 BPM

## 2024-07-18 DIAGNOSIS — B37.41 YEAST CYSTITIS: Primary | ICD-10-CM

## 2024-07-18 PROCEDURE — 3051F HG A1C>EQUAL 7.0%<8.0%: CPT | Mod: CPTII,S$GLB,, | Performed by: INTERNAL MEDICINE

## 2024-07-18 PROCEDURE — 3062F POS MACROALBUMINURIA REV: CPT | Mod: CPTII,S$GLB,, | Performed by: INTERNAL MEDICINE

## 2024-07-18 PROCEDURE — 3075F SYST BP GE 130 - 139MM HG: CPT | Mod: CPTII,S$GLB,, | Performed by: INTERNAL MEDICINE

## 2024-07-18 PROCEDURE — 3008F BODY MASS INDEX DOCD: CPT | Mod: CPTII,S$GLB,, | Performed by: INTERNAL MEDICINE

## 2024-07-18 PROCEDURE — 3066F NEPHROPATHY DOC TX: CPT | Mod: CPTII,S$GLB,, | Performed by: INTERNAL MEDICINE

## 2024-07-18 PROCEDURE — 3079F DIAST BP 80-89 MM HG: CPT | Mod: CPTII,S$GLB,, | Performed by: INTERNAL MEDICINE

## 2024-07-18 PROCEDURE — 99212 OFFICE O/P EST SF 10 MIN: CPT | Mod: S$GLB,,, | Performed by: INTERNAL MEDICINE

## 2024-07-18 RX ORDER — METRONIDAZOLE 500 MG/1
2000 TABLET ORAL ONCE
Qty: 4 TABLET | Refills: 0 | Status: SHIPPED | OUTPATIENT
Start: 2024-07-18 | End: 2024-07-18

## 2024-07-18 RX ORDER — FLUCONAZOLE 150 MG/1
150 TABLET ORAL DAILY
Qty: 1 TABLET | Refills: 0 | Status: SHIPPED | OUTPATIENT
Start: 2024-07-18 | End: 2024-07-19

## 2024-07-18 NOTE — PROGRESS NOTES
"Subjective:      Patient ID: Modesto Nguyễn is a 29 y.o. male.    Chief Complaint: Follow-up and Penile Discharge (He states it started two months ago and he went to the health unit. Tested with negative results. Occurred only in the am on first waking. White-cloudy; no pain, no blood. Last discharge was two weeks ago and he did follow up again with the health unit. )    HPI    Past Medical History:   Diagnosis Date    COVID     Type 1 diabetes mellitus          As above    ROS    Penile discharge  Objective:     Physical Exam  Vitals reviewed.   Constitutional:       Appearance: Normal appearance.   HENT:      Head: Normocephalic.      Mouth/Throat:      Mouth: Mucous membranes are moist.      Pharynx: Oropharynx is clear.   Eyes:      Extraocular Movements: Extraocular movements intact.      Conjunctiva/sclera: Conjunctivae normal.      Pupils: Pupils are equal, round, and reactive to light.   Musculoskeletal:      Right lower leg: No edema.      Left lower leg: No edema.   Skin:     General: Skin is warm.      Capillary Refill: Capillary refill takes less than 2 seconds.   Neurological:      Mental Status: He is alert and oriented to person, place, and time.   Psychiatric:         Mood and Affect: Mood normal.       /84 (BP Location: Right arm, Patient Position: Sitting, BP Method: Medium (Automatic))   Pulse 88   Ht 5' 8" (1.727 m)   Wt 71.8 kg (158 lb 3.2 oz)   SpO2 99%   BMI 24.05 kg/m²     Assessment:       ICD-10-CM ICD-9-CM   1. Yeast cystitis  B37.41 112.2       Plan:     Medication List with Changes/Refills   New Medications    FLUCONAZOLE (DIFLUCAN) 150 MG TAB    Take 1 tablet (150 mg total) by mouth once daily. for 1 day   Current Medications    BLOOD SUGAR DIAGNOSTIC STRP    To check BG 2 times daily, to use with insurance preferred meter    BLOOD-GLUCOSE METER KIT    To check BG 2 times daily, to use with insurance preferred meter    DEXCOM G6  MISC        DEXCOM G6 SENSOR RAGINI    " "INJECT 1 EACH INTO THE SKIN EVERY 10 DAYS.    DEXCOM G6 TRANSMITTER RAGINI        INSULIN (BASAGLAR KWIKPEN U-100 INSULIN) GLARGINE 100 UNITS/ML SUBQ PEN    Inject 25 Units into the skin once daily.    INSULIN DEGLUDEC (TRESIBA FLEXTOUCH U-200) 200 UNIT/ML (3 ML) INSULIN PEN        INSULIN PUMP CART,AUTO,BT-CNTR (OMNIPOD 5 G6 INTRO KIT, GEN 5,) CRTG    Inject 1 kit into the skin continuous.    LANCETS MISC    To check BG 2 times daily, to use with insurance preferred meter    OMNIPOD 5 G6 PODS, GEN 5, CRTG    INJECT 1 CARTRIDGE INTO THE SKIN EVERY 72 HOURS.    ONETOUCH DELICA PLUS LANCET 33 GAUGE MISC    CHECK BLOOD SUGAR TWICE A DAY    PEN NEEDLE, DIABETIC 31 GAUGE X 5/16" NDLE    1 each by Misc.(Non-Drug; Combo Route) route 4 (four) times daily.        1. Yeast cystitis  -     fluconazole (DIFLUCAN) 150 MG Tab; Take 1 tablet (150 mg total) by mouth once daily. for 1 day  Dispense: 1 tablet; Refill: 0  -     metroNIDAZOLE (FLAGYL) 500 MG tablet; Take 4 tablets (2,000 mg total) by mouth once. for 1 dose  Dispense: 4 tablet; Refill: 0         Bring DEXcom  to the next appointment                         "

## 2024-07-22 ENCOUNTER — TELEPHONE (OUTPATIENT)
Dept: PRIMARY CARE CLINIC | Facility: CLINIC | Age: 29
End: 2024-07-22
Payer: MEDICAID

## 2024-07-22 NOTE — TELEPHONE ENCOUNTER
----- Message from Maida Green MA sent at 7/19/2024 12:30 PM CDT -----  Contact: self  He said he was returning a call but Im not sure what the call was about ? He said he did a urine yesterday in the office at the time of his visit  ----- Message -----  From: Nikole Sood  Sent: 7/19/2024  12:20 PM CDT  To: Paul Camarena Staff    Type:  Patient Returning Call    Who Called:Modesto Nguyễn  Who Left Message for Patient:unsure  Does the patient know what this is regarding?:unsure  Would the patient rather a call back or a response via [facility]? Call back  Best Call Back Number:[phone]  Additional Information: n/a

## 2024-07-22 NOTE — TELEPHONE ENCOUNTER
----- Message from aMida Green MA sent at 7/19/2024 12:30 PM CDT -----  Contact: self  He said he was returning a call but Im not sure what the call was about ? He said he did a urine yesterday in the office at the time of his visit  ----- Message -----  From: Nikole Sood  Sent: 7/19/2024  12:20 PM CDT  To: Paul Camarena Staff    Type:  Patient Returning Call    Who Called:Modesto Nguyễn  Who Left Message for Patient:unsure  Does the patient know what this is regarding?:unsure  Would the patient rather a call back or a response via [facility]? Call back  Best Call Back Number:[phone]  Additional Information: n/a

## 2024-07-23 NOTE — TELEPHONE ENCOUNTER
----- Message from Luann Hall sent at 7/23/2024  3:50 PM CDT -----   Patient is calling for prior authorization for Novalog please send to pharmacy..

## 2024-07-24 RX ORDER — INSULIN GLULISINE 100 [IU]/ML
50 INJECTION, SOLUTION SUBCUTANEOUS DAILY
Qty: 50 ML | Refills: 0 | Status: SHIPPED | OUTPATIENT
Start: 2024-07-24

## 2024-07-24 RX ORDER — INSULIN ASPART 100 [IU]/ML
INJECTION, SOLUTION INTRAVENOUS; SUBCUTANEOUS
Qty: 15 EACH | Status: SHIPPED | OUTPATIENT
Start: 2024-07-24 | End: 2024-07-24

## 2024-07-30 ENCOUNTER — TELEPHONE (OUTPATIENT)
Dept: PRIMARY CARE CLINIC | Facility: CLINIC | Age: 29
End: 2024-07-30
Payer: COMMERCIAL

## 2024-07-30 NOTE — TELEPHONE ENCOUNTER
Spoke to pharmacy, the new script has not been sent per Dr Miller. I will ask YVAN Silva to send Humalog.     ----- Message from Raysa Lange sent at 7/29/2024  4:37 PM CDT -----  Contact: self  Type:  Patient Returning Call    Who Called:Modesto Nguyễn  Who Left Message for Patient:unsure  Does the patient know what this is regarding?:medication/insulin  Would the patient rather a call back or a response via [facility]?   Best Call Back Number:[phone]  Additional Information: omni pod/currently out of medication    [id number] [place] - [address] [address]  Phone: [phone] Fax: [phone]

## 2024-07-31 NOTE — TELEPHONE ENCOUNTER
"I called [employer]([phone])the apidura has been approved. Case #[id number]  The pharmacy is advised and states they are waiting on "inventory." I called the patient to advise him of this information. [facility] or if not, then he can call around with other pharmacies to see if it is in stock and if so, then I will ask one of out physician's to send a new script to the new pharmacy that does have it in stock. Pt verbalized understanding.    ----- Message from Raysa Lange sent at 7/30/2024  3:33 PM CDT -----  Contact: self  Type:  Patient Returning Call    Who Called:Modesto Nguyễn  Who Left Message for Patient:unsure  Does the patient know what this is regarding?:insulin/temp out/need refill  Would the patient rather a call back or a response via [facility]?   Best Call Back Number:[phone]  Additional Information: any sample medication until script can be filled  "

## 2024-10-04 RX ORDER — BLOOD-GLUCOSE TRANSMITTER
1 EACH MISCELLANEOUS ONCE
Qty: 1 EACH | Refills: 0 | Status: SHIPPED | OUTPATIENT
Start: 2024-10-04 | End: 2024-10-04

## 2024-10-04 NOTE — TELEPHONE ENCOUNTER
----- Message from Raysa sent at 10/4/2024  8:59 AM CDT -----  Contact: self  Type:  RX Refill Request    Who Called: Modesto Nguyễn  Refill or New Rx:refill  RX Name and Strength:DEXCOM G6 TRANSMITTER Jody  How is the patient currently taking it? (ex. 1XDay):daily  Is this a 30 day or 90 day RX:n/a  Preferred Pharmacy with phone number:  [facility] #[id number] - [place], [place] - [address] [address]  Phone: [phone] Fax: [phone]    Local or Mail Order:local  Ordering Provider:sandeep  Would the patient rather a call back or a response via [facility]?   Best Call Back Number:[phone]  Additional Information: n/a

## 2024-10-22 ENCOUNTER — OFFICE VISIT (OUTPATIENT)
Dept: PRIMARY CARE CLINIC | Facility: CLINIC | Age: 29
End: 2024-10-22
Payer: COMMERCIAL

## 2024-10-22 VITALS
BODY MASS INDEX: 24.05 KG/M2 | DIASTOLIC BLOOD PRESSURE: 94 MMHG | HEART RATE: 82 BPM | HEIGHT: 68 IN | SYSTOLIC BLOOD PRESSURE: 152 MMHG | WEIGHT: 158.69 LBS

## 2024-10-22 DIAGNOSIS — E10.69 TYPE 1 DIABETES MELLITUS WITH OTHER SPECIFIED COMPLICATION: Primary | ICD-10-CM

## 2024-10-22 DIAGNOSIS — E78.5 HYPERLIPIDEMIA, UNSPECIFIED HYPERLIPIDEMIA TYPE: ICD-10-CM

## 2024-10-22 LAB — HBA1C MFR BLD: 7.8 % (ref 4–6)

## 2024-10-22 NOTE — PROGRESS NOTES
Subjective:      Patient ID: Modesto Nguyễn is a 29 y.o. male.    Chief Complaint: Follow-up    HPI    Past Medical History:   Diagnosis Date    COVID     Type 1 diabetes mellitus        Patient with type 1 DM  has been trained for omnipod. He is using his omnipod and has a Dexcom 6. Readings downloaded still mostly high after meals, states that his pump sometimes malfunctions and even when he gives himself insulin it keeps rising. He was advised by the rep regarding fixing the tubing and he states it works but a lot of movement (when he exercises) can sometimes displace it.   A1c same as previous, no lows noted which is good  He has had some lows in the past but that is when he doesn't eat or he exercises too much   He states he googles the carb count of his meals and enters the data. Sometimes he won't be able to finish the carbs. He eats quite a bit of carbs      Review of Systems   Constitutional:  Negative for chills and fever.   HENT:  Negative for hearing loss.    Eyes:  Negative for blurred vision.   Respiratory:  Negative for cough, shortness of breath and wheezing.    Cardiovascular:  Negative for chest pain, palpitations and leg swelling.   Gastrointestinal:  Negative for abdominal pain, blood in stool, constipation, diarrhea, melena, nausea and vomiting.   Genitourinary:  Negative for dysuria, frequency and urgency.   Musculoskeletal:  Negative for falls.   Skin:  Negative for rash.   Neurological:  Negative for dizziness and headaches.   Endo/Heme/Allergies:  Does not bruise/bleed easily.   Psychiatric/Behavioral:  Negative for depression. The patient is not nervous/anxious.      Objective:     Physical Exam  Vitals reviewed.   Constitutional:       Appearance: Normal appearance.   HENT:      Head: Normocephalic.      Mouth/Throat:      Mouth: Mucous membranes are moist.      Pharynx: Oropharynx is clear.   Eyes:      Extraocular Movements: Extraocular movements intact.      Conjunctiva/sclera:  "Conjunctivae normal.      Pupils: Pupils are equal, round, and reactive to light.   Cardiovascular:      Rate and Rhythm: Normal rate and regular rhythm.   Pulmonary:      Effort: Pulmonary effort is normal.      Breath sounds: Normal breath sounds.   Abdominal:      General: Bowel sounds are normal.   Musculoskeletal:      Right lower leg: No edema.      Left lower leg: No edema.   Skin:     General: Skin is warm.      Capillary Refill: Capillary refill takes less than 2 seconds.   Neurological:      Mental Status: He is alert and oriented to person, place, and time.   Psychiatric:         Mood and Affect: Mood normal.       BP (!) 152/94 (BP Location: Right arm, Patient Position: Sitting)   Pulse 82   Ht 5' 8" (1.727 m)   Wt 72 kg (158 lb 11.2 oz)   BMI 24.13 kg/m²     Assessment:       ICD-10-CM ICD-9-CM   1. Type 1 diabetes mellitus with other specified complication  E10.69 250.81   2. Hyperlipidemia, unspecified hyperlipidemia type  E78.5 272.4       Plan:     Medication List with Changes/Refills   Current Medications    BLOOD SUGAR DIAGNOSTIC STRP    To check BG 2 times daily, to use with insurance preferred meter    BLOOD-GLUCOSE METER KIT    To check BG 2 times daily, to use with insurance preferred meter    DEXCOM G6  MISC        DEXCOM G6 SENSOR RAGINI    INJECT 1 EACH INTO THE SKIN EVERY 10 DAYS.    DEXCOM G6 TRANSMITTER RAGINI    1 each by Continuous Sub-Q Infusn (via wearable injector) route once. for 1 dose    INSULIN (BASAGLAR KWIKPEN U-100 INSULIN) GLARGINE 100 UNITS/ML SUBQ PEN    Inject 25 Units into the skin once daily.    INSULIN DEGLUDEC (TRESIBA FLEXTOUCH U-200) 200 UNIT/ML (3 ML) INSULIN PEN        INSULIN GLULISINE U-100 (APIDRA U-100 INSULIN) 100 UNIT/ML INJECTION    Inject 50 Units into the skin Daily.    INSULIN PUMP CART,AUTO,BT-CNTR (OMNIPOD 5 G6 INTRO KIT, GEN 5,) CRTG    Inject 1 kit into the skin continuous.    LANCETS MISC    To check BG 2 times daily, to use with insurance " "preferred meter    OMNIPOD 5 G6 PODS, GEN 5, CRTG    INJECT 1 CARTRIDGE INTO THE SKIN EVERY 72 HOURS.    ONETOUCH DELICA PLUS LANCET 33 GAUGE MISC    CHECK BLOOD SUGAR TWICE A DAY    PEN NEEDLE, DIABETIC 31 GAUGE X 5/16" NDLE    1 each by Misc.(Non-Drug; Combo Route) route 4 (four) times daily.        1. Type 1 diabetes mellitus with other specified complication  -     Ambulatory referral/consult to Ophthalmology; Future; Expected date: 10/31/2024    2. Hyperlipidemia, unspecified hyperlipidemia type         Setting for omnipod continued with same basal, decreased insulin to carb ratio from 15 to 12. Sensitivity is currently at 60. Advised to check blood glucose an hour after bolusing to see if blood glucose adequately comes down provided his omnipod is not malfunctioning     Requesting a G7 and will need to see if it aligns with omnipod and if insurance covers it      Future Appointments   Date Time Provider Department Center   1/27/2025  9:20 AM Nicol Miller MD [facility]  [place]                "

## 2024-10-30 RX ORDER — BLOOD-GLUCOSE TRANSMITTER
EACH MISCELLANEOUS
Qty: 1 EACH | Refills: 0 | Status: SHIPPED | OUTPATIENT
Start: 2024-10-30

## 2024-11-04 RX ORDER — INSULIN GLULISINE 100 [IU]/ML
50 INJECTION, SOLUTION SUBCUTANEOUS DAILY
Qty: 50 ML | Refills: 0 | Status: SHIPPED | OUTPATIENT
Start: 2024-11-04

## 2024-11-21 ENCOUNTER — OFFICE VISIT (OUTPATIENT)
Dept: PRIMARY CARE CLINIC | Facility: CLINIC | Age: 29
End: 2024-11-21
Payer: COMMERCIAL

## 2024-11-21 VITALS
DIASTOLIC BLOOD PRESSURE: 110 MMHG | WEIGHT: 157.69 LBS | HEIGHT: 68 IN | OXYGEN SATURATION: 98 % | HEART RATE: 90 BPM | BODY MASS INDEX: 23.9 KG/M2 | SYSTOLIC BLOOD PRESSURE: 148 MMHG

## 2024-11-21 DIAGNOSIS — E10.69 TYPE 1 DIABETES MELLITUS WITH OTHER SPECIFIED COMPLICATION: ICD-10-CM

## 2024-11-21 DIAGNOSIS — I10 HYPERTENSION, UNSPECIFIED TYPE: ICD-10-CM

## 2024-11-21 DIAGNOSIS — E78.5 HYPERLIPIDEMIA, UNSPECIFIED HYPERLIPIDEMIA TYPE: Primary | ICD-10-CM

## 2024-11-21 LAB
BASOPHILS NFR BLD: 0.8 % (ref 0–3)
BUN SERPL-MCNC: 9 MG/DL (ref 7–18)
BUN/CREAT SERPL: 8.41 RATIO (ref 7–18)
CALCIUM SERPL-MCNC: 9.3 MG/DL (ref 8.8–10.5)
CHLORIDE SERPL-SCNC: 102 MMOL/L (ref 100–108)
CHOLEST SERPL-MSCNC: 219 MG/DL
CO2 SERPL-SCNC: 34 MMOL/L (ref 21–32)
CREAT SERPL-MCNC: 1.07 MG/DL (ref 0.7–1.3)
EOSINOPHIL NFR BLD: 5.2 % (ref 1–3)
ERYTHROCYTE [DISTWIDTH] IN BLOOD BY AUTOMATED COUNT: 12.6 % (ref 12.5–18)
GFR ESTIMATION: 96
GLUCOSE SERPL-MCNC: 156 MG/DL (ref 70–110)
HCT VFR BLD AUTO: 41.4 % (ref 42–52)
HDL/CHOLESTEROL RATIO: 2.7 RATIO
HDLC SERPL-MCNC: 82 MG/DL (ref 32–72)
HGB BLD-MCNC: 14.7 G/DL (ref 14–18)
LDLC SERPL CALC-MCNC: 130 MG/DL
LYMPHOCYTES NFR BLD: 29.9 % (ref 25–40)
MCH RBC QN AUTO: 30 PG (ref 27–31.2)
MCHC RBC AUTO-ENTMCNC: 35.5 G/DL (ref 31.8–35.4)
MCV RBC AUTO: 84.5 FL (ref 80–97)
MONOCYTES NFR BLD: 10.4 % (ref 1–15)
NEUTROPHILS # BLD AUTO: 2.68 10*3/UL (ref 1.8–7.7)
NEUTROPHILS NFR BLD: 53.3 % (ref 37–80)
NUCLEATED RED BLOOD CELLS: 0 %
PLATELETS: 303 10*3/UL (ref 142–424)
POTASSIUM SERPL-SCNC: 4.1 MMOL/L (ref 3.6–5.2)
RBC # BLD AUTO: 4.9 10*6/UL (ref 4.7–6.1)
SODIUM BLD-SCNC: 136 MMOL/L (ref 135–145)
TRIGL SERPL-MCNC: 35 MG/DL (ref 30–200)
VLDL CHOLESTEROL: 7 MG/DL (ref 0–40)
WBC # BLD: 5 10*3/UL (ref 4.6–10.2)

## 2024-11-21 PROCEDURE — 99214 OFFICE O/P EST MOD 30 MIN: CPT | Mod: S$PBB,,, | Performed by: INTERNAL MEDICINE

## 2024-11-21 RX ORDER — BLOOD-GLUCOSE SENSOR
1 EACH MISCELLANEOUS
Qty: 4 EACH | Refills: 0 | Status: SHIPPED | OUTPATIENT
Start: 2024-11-21 | End: 2025-11-21

## 2024-11-21 RX ORDER — LOSARTAN POTASSIUM 50 MG/1
50 TABLET ORAL DAILY
Qty: 90 TABLET | Refills: 3 | Status: SHIPPED | OUTPATIENT
Start: 2024-11-21 | End: 2025-11-21

## 2024-11-21 NOTE — PROGRESS NOTES
Subjective:      Patient ID: Modesto Nguyễn is a 29 y.o. male.    Chief Complaint: Hypertension and Loss of Vision (This began this am. The patient has had 3 different readings. 166/111; 157/113; 173/112. )    HPI    Past Medical History:   Diagnosis Date    COVID     Type 1 diabetes mellitus      Patient noted to have elevated BP at ophthalmologist office, advised to come see PCP. He has not been checking BP at home. Here with his mother today       Review of Systems   Constitutional:  Negative for chills and fever.   HENT:  Negative for hearing loss.    Eyes:  Positive for blurred vision.   Respiratory:  Negative for cough, shortness of breath and wheezing.    Cardiovascular:  Negative for chest pain, palpitations and leg swelling.   Gastrointestinal:  Negative for abdominal pain, blood in stool, constipation, diarrhea, melena, nausea and vomiting.   Genitourinary:  Negative for dysuria, frequency and urgency.   Musculoskeletal:  Negative for falls.   Skin:  Negative for rash.   Neurological:  Negative for dizziness and headaches.   Endo/Heme/Allergies:  Does not bruise/bleed easily.   Psychiatric/Behavioral:  Negative for depression. The patient is not nervous/anxious.      Objective:     Physical Exam  Vitals reviewed.   Constitutional:       Appearance: Normal appearance.   HENT:      Head: Normocephalic.      Mouth/Throat:      Mouth: Mucous membranes are moist.      Pharynx: Oropharynx is clear.   Eyes:      Extraocular Movements: Extraocular movements intact.      Pupils: Pupils are equal, round, and reactive to light.      Comments: Slightly erythematous, patient has a retinal specialist he sees   Cardiovascular:      Rate and Rhythm: Normal rate and regular rhythm.   Pulmonary:      Effort: Pulmonary effort is normal.      Breath sounds: Normal breath sounds.   Abdominal:      General: Bowel sounds are normal.   Musculoskeletal:      Right lower leg: No edema.      Left lower leg: No edema.   Skin:      "General: Skin is warm.      Capillary Refill: Capillary refill takes less than 2 seconds.   Neurological:      Mental Status: He is alert and oriented to person, place, and time.   Psychiatric:         Mood and Affect: Mood normal.       BP (!) 148/110 (BP Location: Left arm, Patient Position: Sitting)   Pulse 90   Ht 5' 8" (1.727 m)   Wt 71.5 kg (157 lb 11.2 oz)   SpO2 98%   BMI 23.98 kg/m²     Assessment:       ICD-10-CM ICD-9-CM   1. Hyperlipidemia, unspecified hyperlipidemia type  E78.5 272.4   2. Type 1 diabetes mellitus with other specified complication  E10.69 250.81   3. Hypertension, unspecified type  I10 401.9       Plan:     Medication List with Changes/Refills   New Medications    ATORVASTATIN (LIPITOR) 20 MG TABLET    Take 1 tablet (20 mg total) by mouth once daily.    BLOOD-GLUCOSE SENSOR (DEXCOM G7 SENSOR) RAGINI    1 Device by Misc.(Non-Drug; Combo Route) route every 14 (fourteen) days.    LOSARTAN (COZAAR) 50 MG TABLET    Take 1 tablet (50 mg total) by mouth once daily.   Current Medications    BLOOD SUGAR DIAGNOSTIC STRP    To check BG 2 times daily, to use with insurance preferred meter    BLOOD-GLUCOSE METER KIT    To check BG 2 times daily, to use with insurance preferred meter    DEXCOM G6  MISC        DEXCOM G6 SENSOR RAGINI    INJECT 1 EACH INTO THE SKIN EVERY 10 DAYS.    DEXCOM G6 TRANSMITTER RAGINI    USE AS DIRECTED    INSULIN GLULISINE U-100 (APIDRA U-100 INSULIN) 100 UNIT/ML INJECTION    INJECT 50 UNITS INTO THE SKIN DAILY    INSULIN PUMP CART,AUTO,BT-CNTR (OMNIPOD 5 G6 INTRO KIT, GEN 5,) CRTG    Inject 1 kit into the skin continuous.    LANCETS MISC    To check BG 2 times daily, to use with insurance preferred meter    OMNIPOD 5 G6 PODS, GEN 5, CRTG    INJECT 1 CARTRIDGE INTO THE SKIN EVERY 72 HOURS.    ONETOUCH DELICA PLUS LANCET 33 GAUGE MISC    CHECK BLOOD SUGAR TWICE A DAY    PEN NEEDLE, DIABETIC 31 GAUGE X 5/16" NDLE    1 each by Misc.(Non-Drug; Combo Route) route 4 (four) " times daily.   Discontinued Medications    INSULIN (BASAGLAR KWIKPEN U-100 INSULIN) GLARGINE 100 UNITS/ML SUBQ PEN    Inject 25 Units into the skin once daily.    INSULIN DEGLUDEC (TRESIBA FLEXTOUCH U-200) 200 UNIT/ML (3 ML) INSULIN PEN            1. Hyperlipidemia, unspecified hyperlipidemia type  -     atorvastatin (LIPITOR) 20 MG tablet; Take 1 tablet (20 mg total) by mouth once daily.  Dispense: 90 tablet; Refill: 3    2. Type 1 diabetes mellitus with other specified complication  -     blood-glucose sensor (DEXCOM G7 SENSOR) Jody; 1 Device by Misc.(Non-Drug; Combo Route) route every 14 (fourteen) days.  Dispense: 4 each; Refill: 0  -     Lipid Panel; Future; Expected date: 11/21/2024    3. Hypertension, unspecified type  -     losartan (COZAAR) 50 MG tablet; Take 1 tablet (50 mg total) by mouth once daily.  Dispense: 90 tablet; Refill: 3  -     CBC Auto Differential; Future; Expected date: 11/21/2024  -     Basic Metabolic Panel; Future; Expected date: 11/21/2024  -     Lipid Panel; Future; Expected date: 11/21/2024         Advised to check BP at home and if still high with the 50 mg, can increase to 2 tablets        Future Appointments   Date Time Provider Department Center   12/5/2024 10:20 AM Nicol Miller MD [facility]   1/27/2025  9:20 AM Nicol Miller MD [facility]

## 2024-11-22 RX ORDER — ATORVASTATIN CALCIUM 20 MG/1
20 TABLET, FILM COATED ORAL DAILY
Qty: 90 TABLET | Refills: 3 | Status: SHIPPED | OUTPATIENT
Start: 2024-11-22 | End: 2025-11-22

## 2024-11-25 ENCOUNTER — TELEPHONE (OUTPATIENT)
Dept: PRIMARY CARE CLINIC | Facility: CLINIC | Age: 29
End: 2024-11-25
Payer: COMMERCIAL

## 2024-11-25 NOTE — TELEPHONE ENCOUNTER
Between 150-130's/; started taking raw garlic cloves and honey. 130/90 this morning.     ----- Message from Eugenia sent at 11/25/2024 11:21 AM CST -----  Regarding: Medication  Contact: patient  Per phone call with patient, he stated that the medication that was just prescribe is not working and maybe the dosages is low and may need to be increase.  Please return call at [phone] (home).    Thanks,  SJ

## 2024-11-26 ENCOUNTER — TELEPHONE (OUTPATIENT)
Dept: PRIMARY CARE CLINIC | Facility: CLINIC | Age: 29
End: 2024-11-26
Payer: COMMERCIAL

## 2024-12-02 ENCOUNTER — TELEPHONE (OUTPATIENT)
Dept: PRIMARY CARE CLINIC | Facility: CLINIC | Age: 29
End: 2024-12-02
Payer: COMMERCIAL

## 2024-12-02 VITALS — SYSTOLIC BLOOD PRESSURE: 143 MMHG | DIASTOLIC BLOOD PRESSURE: 93 MMHG

## 2024-12-05 ENCOUNTER — TELEPHONE (OUTPATIENT)
Dept: PRIMARY CARE CLINIC | Facility: CLINIC | Age: 29
End: 2024-12-05

## 2024-12-05 ENCOUNTER — OFFICE VISIT (OUTPATIENT)
Dept: PRIMARY CARE CLINIC | Facility: CLINIC | Age: 29
End: 2024-12-05
Payer: COMMERCIAL

## 2024-12-05 VITALS
BODY MASS INDEX: 24.12 KG/M2 | HEART RATE: 82 BPM | WEIGHT: 159.13 LBS | HEIGHT: 68 IN | SYSTOLIC BLOOD PRESSURE: 152 MMHG | DIASTOLIC BLOOD PRESSURE: 99 MMHG | OXYGEN SATURATION: 99 %

## 2024-12-05 DIAGNOSIS — E10.69 TYPE 1 DIABETES MELLITUS WITH OTHER SPECIFIED COMPLICATION: ICD-10-CM

## 2024-12-05 DIAGNOSIS — I10 HYPERTENSION, UNSPECIFIED TYPE: Primary | ICD-10-CM

## 2024-12-05 PROCEDURE — 99214 OFFICE O/P EST MOD 30 MIN: CPT | Mod: S$PBB,,, | Performed by: INTERNAL MEDICINE

## 2024-12-05 RX ORDER — AMLODIPINE BESYLATE 5 MG/1
5 TABLET ORAL DAILY
Qty: 90 TABLET | Refills: 3 | Status: SHIPPED | OUTPATIENT
Start: 2024-12-05 | End: 2025-12-05

## 2024-12-05 RX ORDER — LOSARTAN POTASSIUM AND HYDROCHLOROTHIAZIDE 12.5; 1 MG/1; MG/1
1 TABLET ORAL DAILY
Qty: 90 TABLET | Refills: 3 | Status: SHIPPED | OUTPATIENT
Start: 2024-12-05 | End: 2025-12-05

## 2024-12-05 NOTE — PROGRESS NOTES
Subjective:      Patient ID: Modesto Nguyễn is a 29 y.o. male.    Chief Complaint: Follow-up (Patient's vision has not changed from his last visit. The patient states his BP has not decreased with the medication. He states he only has intermittent nausea. )    HPI    Past Medical History:   Diagnosis Date    COVID     Type 1 diabetes mellitus          Patient with DM type 1 on omnipod and Dexcom G6 here for follow up of HTN. He was scheduled for eye surgery by Dr Foster but his BP was too high so it was cancelled. He was started on losartan 50 mg and increased to 100 mg but his BP has not budged       Review of Systems   Constitutional:  Negative for chills and fever.   HENT:  Negative for hearing loss.    Eyes:  Positive for blurred vision.        Retinal detachment    Respiratory:  Negative for cough, shortness of breath and wheezing.    Cardiovascular:  Negative for chest pain, palpitations and leg swelling.   Gastrointestinal:  Negative for abdominal pain, blood in stool, constipation, diarrhea, melena, nausea and vomiting.   Genitourinary:  Negative for dysuria, frequency and urgency.   Musculoskeletal:  Negative for falls.   Skin:  Negative for rash.   Neurological:  Negative for dizziness and headaches.   Endo/Heme/Allergies:  Does not bruise/bleed easily.   Psychiatric/Behavioral:  Negative for depression. The patient is not nervous/anxious.      Objective:     Physical Exam  Vitals reviewed.   Constitutional:       Appearance: Normal appearance.   HENT:      Head: Normocephalic.      Mouth/Throat:      Mouth: Mucous membranes are moist.      Pharynx: Oropharynx is clear.   Eyes:      Extraocular Movements: Extraocular movements intact.      Conjunctiva/sclera: Conjunctivae normal.      Pupils: Pupils are equal, round, and reactive to light.   Cardiovascular:      Rate and Rhythm: Normal rate and regular rhythm.   Pulmonary:      Effort: Pulmonary effort is normal.      Breath sounds: Normal breath sounds.  "  Abdominal:      General: Bowel sounds are normal.   Musculoskeletal:      Right lower leg: No edema.      Left lower leg: No edema.   Skin:     General: Skin is warm.      Capillary Refill: Capillary refill takes less than 2 seconds.   Neurological:      Mental Status: He is alert and oriented to person, place, and time.   Psychiatric:         Mood and Affect: Mood normal.       BP (!) 152/99 (BP Location: Right arm, Patient Position: Sitting)   Pulse 82   Ht 5' 8" (1.727 m)   Wt 72.2 kg (159 lb 1.6 oz)   SpO2 99%   BMI 24.19 kg/m²     Assessment:       ICD-10-CM ICD-9-CM   1. Hypertension, unspecified type  I10 401.9   2. Type 1 diabetes mellitus with other specified complication  E10.69 250.81       Plan:     Medication List with Changes/Refills   New Medications    AMLODIPINE (NORVASC) 5 MG TABLET    Take 1 tablet (5 mg total) by mouth once daily.    LOSARTAN-HYDROCHLOROTHIAZIDE 100-12.5 MG (HYZAAR) 100-12.5 MG TAB    Take 1 tablet by mouth once daily.   Current Medications    ATORVASTATIN (LIPITOR) 20 MG TABLET    Take 1 tablet (20 mg total) by mouth once daily.    BLOOD SUGAR DIAGNOSTIC STRP    To check BG 2 times daily, to use with insurance preferred meter    BLOOD-GLUCOSE METER KIT    To check BG 2 times daily, to use with insurance preferred meter    BLOOD-GLUCOSE SENSOR (DEXCOM G7 SENSOR) RAGINI    1 Device by Misc.(Non-Drug; Combo Route) route every 14 (fourteen) days.    DEXCOM G6  MISC        DEXCOM G6 SENSOR RAGINI    INJECT 1 EACH INTO THE SKIN EVERY 10 DAYS.    DEXCOM G6 TRANSMITTER RAGINI    USE AS DIRECTED    INSULIN GLULISINE U-100 (APIDRA U-100 INSULIN) 100 UNIT/ML INJECTION    INJECT 50 UNITS INTO THE SKIN DAILY    INSULIN PUMP CART,AUTO,BT-CNTR (OMNIPOD 5 G6 INTRO KIT, GEN 5,) CRTG    Inject 1 kit into the skin continuous.    LANCETS MISC    To check BG 2 times daily, to use with insurance preferred meter    OMNIPOD 5 G6 PODS, GEN 5, CRTG    INJECT 1 CARTRIDGE INTO THE SKIN EVERY 72 " "HOURS.    ONETOUCH DELICA PLUS LANCET 33 GAUGE MISC    CHECK BLOOD SUGAR TWICE A DAY    PEN NEEDLE, DIABETIC 31 GAUGE X 5/16" NDLE    1 each by Misc.(Non-Drug; Combo Route) route 4 (four) times daily.   Discontinued Medications    LOSARTAN (COZAAR) 50 MG TABLET    Take 1 tablet (50 mg total) by mouth once daily.        1. Hypertension, unspecified type  -     losartan-hydrochlorothiazide 100-12.5 mg (HYZAAR) 100-12.5 mg Tab; Take 1 tablet by mouth once daily.  Dispense: 90 tablet; Refill: 3  -     amLODIPine (NORVASC) 5 MG tablet; Take 1 tablet (5 mg total) by mouth once daily.  Dispense: 90 tablet; Refill: 3    2. Type 1 diabetes mellitus with other specified complication         Continue current omnipod settings, insurance has accepted G7 and patient was informed of this and he should be getting one shortly     I talked to Dr Foster staff and he does not have increased IOP so no risk of angle closure glaucoma with starting hctz. I have added more medications and am hoping at next visit his BP is controlled as he needs this surgery in the next couple of weeks       Future Appointments   Date Time Provider Department Center   12/16/2024  3:40 PM Nicol Miller MD [facility]   1/27/2025  9:20 AM Nicol Miller MD [facility]                "

## 2024-12-05 NOTE — TELEPHONE ENCOUNTER
Spoke to pt and he is going to receive his Dexcom g7 sensor from [employer]--this will be cheaper for pt

## 2024-12-16 ENCOUNTER — OFFICE VISIT (OUTPATIENT)
Dept: PRIMARY CARE CLINIC | Facility: CLINIC | Age: 29
End: 2024-12-16
Payer: COMMERCIAL

## 2024-12-16 VITALS
OXYGEN SATURATION: 99 % | HEART RATE: 92 BPM | BODY MASS INDEX: 23.04 KG/M2 | SYSTOLIC BLOOD PRESSURE: 118 MMHG | DIASTOLIC BLOOD PRESSURE: 85 MMHG | HEIGHT: 68 IN | WEIGHT: 152 LBS

## 2024-12-16 DIAGNOSIS — I10 HYPERTENSION, UNSPECIFIED TYPE: Primary | ICD-10-CM

## 2024-12-16 DIAGNOSIS — E78.5 HYPERLIPIDEMIA, UNSPECIFIED HYPERLIPIDEMIA TYPE: ICD-10-CM

## 2024-12-16 DIAGNOSIS — E10.69 TYPE 1 DIABETES MELLITUS WITH OTHER SPECIFIED COMPLICATION: ICD-10-CM

## 2024-12-16 PROCEDURE — 99213 OFFICE O/P EST LOW 20 MIN: CPT | Mod: S$PBB,,, | Performed by: INTERNAL MEDICINE

## 2024-12-16 RX ORDER — AA/PROT/LYSINE/METHIO/VIT C/B6 50-12.5 MG
TABLET ORAL
COMMUNITY

## 2024-12-16 RX ORDER — MULTIVITAMIN WITH IRON
TABLET ORAL DAILY
COMMUNITY

## 2024-12-16 NOTE — PROGRESS NOTES
Subjective:      Patient ID: Modesto Nguyễn is a 29 y.o. male.    Chief Complaint: Follow-up and Shoulder Pain (Patient states he is having left shoulder blade pain that has begun hurting on the past 4 days. Intermittent pain; Numbness/tingles intermittent if he rest on the elbow. He says he has to ball up his fist and move it and the numbness and tingles go away. )    HPI    Past Medical History:   Diagnosis Date    COVID     Type 1 diabetes mellitus      Patient with DM type 1 previously came in with uncontrolled HTN. Medications were changed/added. He was able to get retinal surgery done as his BP came down. Normal in clinic today. Also was started on a statin  Reports some shoulder pains.      Review of Systems   Constitutional:  Negative for chills and fever.   HENT:  Negative for hearing loss.    Eyes:  Positive for blurred vision.   Respiratory:  Negative for cough, shortness of breath and wheezing.    Cardiovascular:  Negative for chest pain, palpitations and leg swelling.   Gastrointestinal:  Negative for abdominal pain, blood in stool, constipation, diarrhea, melena, nausea and vomiting.   Genitourinary:  Negative for dysuria, frequency and urgency.   Musculoskeletal:  Positive for joint pain. Negative for falls.   Skin:  Negative for rash.   Neurological:  Negative for dizziness and headaches.   Endo/Heme/Allergies:  Does not bruise/bleed easily.   Psychiatric/Behavioral:  Negative for depression. The patient is not nervous/anxious.      Objective:     Physical Exam  Vitals reviewed.   Constitutional:       Appearance: Normal appearance.   HENT:      Head: Normocephalic.      Mouth/Throat:      Mouth: Mucous membranes are moist.      Pharynx: Oropharynx is clear.   Eyes:      Extraocular Movements: Extraocular movements intact.      Conjunctiva/sclera: Conjunctivae normal.      Pupils: Pupils are equal, round, and reactive to light.      Comments: Left conjunctiva red from recent surgery   "  Cardiovascular:      Rate and Rhythm: Normal rate and regular rhythm.   Pulmonary:      Effort: Pulmonary effort is normal.      Breath sounds: Normal breath sounds.   Abdominal:      General: Bowel sounds are normal.   Musculoskeletal:      Right lower leg: No edema.      Left lower leg: No edema.   Skin:     General: Skin is warm.      Capillary Refill: Capillary refill takes less than 2 seconds.   Neurological:      Mental Status: He is alert and oriented to person, place, and time.   Psychiatric:         Mood and Affect: Mood normal.       /85 (BP Location: Right arm, Patient Position: Sitting)   Pulse 92   Ht 5' 8" (1.727 m)   Wt 68.9 kg (152 lb)   SpO2 99%   BMI 23.11 kg/m²     Assessment:       ICD-10-CM ICD-9-CM   1. Hypertension, unspecified type  I10 401.9   2. Hyperlipidemia, unspecified hyperlipidemia type  E78.5 272.4   3. Type 1 diabetes mellitus with other specified complication  E10.69 250.81       Plan:     Medication List with Changes/Refills   Current Medications    AMLODIPINE (NORVASC) 5 MG TABLET    Take 1 tablet (5 mg total) by mouth once daily.    ATORVASTATIN (LIPITOR) 20 MG TABLET    Take 1 tablet (20 mg total) by mouth once daily.    BLOOD SUGAR DIAGNOSTIC STRP    To check BG 2 times daily, to use with insurance preferred meter    BLOOD-GLUCOSE METER KIT    To check BG 2 times daily, to use with insurance preferred meter    BLOOD-GLUCOSE SENSOR (DEXCOM G7 SENSOR) RAGINI    1 Device by Misc.(Non-Drug; Combo Route) route every 14 (fourteen) days.    DEXCOM G6  MISC        DEXCOM G6 SENSOR RAGINI    INJECT 1 EACH INTO THE SKIN EVERY 10 DAYS.    DEXCOM G6 TRANSMITTER RAGINI    USE AS DIRECTED    INSULIN GLULISINE U-100 (APIDRA U-100 INSULIN) 100 UNIT/ML INJECTION    INJECT 50 UNITS INTO THE SKIN DAILY    INSULIN PUMP CART,AUTO,BT-CNTR (OMNIPOD 5 G6 INTRO KIT, GEN 5,) CRTG    Inject 1 kit into the skin continuous.    LANCETS MISC    To check BG 2 times daily, to use with insurance " "preferred meter    LOSARTAN-HYDROCHLOROTHIAZIDE 100-12.5 MG (HYZAAR) 100-12.5 MG TAB    Take 1 tablet by mouth once daily.    MAGNESIUM OXIDE-MG AA CHELATE (MG-PLUS-PROTEIN) 133 MG TAB    Take by mouth.    OMEGA-3 FATTY ACIDS/FISH OIL (FISH OIL-OMEGA-3 FATTY ACIDS) 300-1,000 MG CAPSULE    Take by mouth once daily.    OMNIPOD 5 G6 PODS, GEN 5, CRTG    INJECT 1 CARTRIDGE INTO THE SKIN EVERY 72 HOURS.    ONETOUCH DELICA PLUS LANCET 33 GAUGE MISC    CHECK BLOOD SUGAR TWICE A DAY    PEN NEEDLE, DIABETIC 31 GAUGE X 5/16" NDLE    1 each by Misc.(Non-Drug; Combo Route) route 4 (four) times daily.        1. Hypertension, unspecified type    2. Hyperlipidemia, unspecified hyperlipidemia type    3. Type 1 diabetes mellitus with other specified complication         Patient denies any recent hypoglycemia episodes. Will download his readings from website. A1c at the next visit     Continue current medications.     Recommended NSAIDs for shoulder pain and lidocaine patch. If continues to have pain will re evaluate at the next visit       Future Appointments   Date Time Provider Department Center   1/27/2025  9:20 AM Nicol Miller MD [facility]                "

## 2025-01-06 ENCOUNTER — TELEPHONE (OUTPATIENT)
Dept: PRIMARY CARE CLINIC | Facility: CLINIC | Age: 30
End: 2025-01-06
Payer: COMMERCIAL

## 2025-01-06 NOTE — TELEPHONE ENCOUNTER
"Pt states he "has it handled."     ----- Message from Jaden Jordan sent at 1/3/2025  1:35 PM CST -----    ----- Message -----  From: Luann Hall  Sent: 1/3/2025  12:25 PM CST  To: Paul Camarena Staff    Patient requesting call back in regards to ángel pod update please call him back at [phone]  "

## 2025-01-30 ENCOUNTER — OFFICE VISIT (OUTPATIENT)
Dept: PRIMARY CARE CLINIC | Facility: CLINIC | Age: 30
End: 2025-01-30
Payer: COMMERCIAL

## 2025-01-30 VITALS
TEMPERATURE: 98 F | SYSTOLIC BLOOD PRESSURE: 129 MMHG | HEIGHT: 68 IN | DIASTOLIC BLOOD PRESSURE: 88 MMHG | WEIGHT: 160.63 LBS | RESPIRATION RATE: 18 BRPM | OXYGEN SATURATION: 99 % | HEART RATE: 87 BPM | BODY MASS INDEX: 24.34 KG/M2

## 2025-01-30 DIAGNOSIS — E78.5 HYPERLIPIDEMIA, UNSPECIFIED HYPERLIPIDEMIA TYPE: ICD-10-CM

## 2025-01-30 DIAGNOSIS — E65 LIPOHYPERTROPHY: ICD-10-CM

## 2025-01-30 DIAGNOSIS — E10.69 TYPE 1 DIABETES MELLITUS WITH OTHER SPECIFIED COMPLICATION: Primary | ICD-10-CM

## 2025-01-30 DIAGNOSIS — I10 HYPERTENSION, UNSPECIFIED TYPE: ICD-10-CM

## 2025-01-30 LAB — HBA1C MFR BLD: 7.5 % (ref 4–6)

## 2025-01-30 PROCEDURE — 95251 CONT GLUC MNTR ANALYSIS I&R: CPT | Mod: ,,, | Performed by: INTERNAL MEDICINE

## 2025-01-30 PROCEDURE — 99214 OFFICE O/P EST MOD 30 MIN: CPT | Mod: S$PBB,,, | Performed by: INTERNAL MEDICINE

## 2025-01-30 NOTE — PROGRESS NOTES
Subjective:      Patient ID: Modesto Nguyễn is a 29 y.o. male.    Chief Complaint: Follow-up (Pt presents today for his 3 month f/u. Pt would like to discuss pain on back from previous pump site.)    HPI    Past Medical History:   Diagnosis Date    COVID     Type 1 diabetes mellitus        Patient with DM type 1 previously came in with uncontrolled HTN. Medications were changed/added. He was able to get retinal surgery done as his BP came down. Normal in clinic today. Also was started on a statin  Reported some shoulder pains at the last visit  Today he reports an area of skin thickening on his back side from where he uses his pump.   His blood glucose has been running high still.         Review of Systems   Constitutional:  Negative for chills and fever.   HENT:  Negative for hearing loss.    Eyes:  Negative for blurred vision.   Respiratory:  Negative for cough, shortness of breath and wheezing.    Cardiovascular:  Negative for chest pain, palpitations and leg swelling.   Gastrointestinal:  Negative for abdominal pain, blood in stool, constipation, diarrhea, melena, nausea and vomiting.   Genitourinary:  Negative for dysuria, frequency and urgency.   Musculoskeletal:  Negative for falls.   Skin:  Negative for rash.   Neurological:  Negative for dizziness and headaches.   Endo/Heme/Allergies:  Does not bruise/bleed easily.   Psychiatric/Behavioral:  Negative for depression. The patient is not nervous/anxious.      Objective:     Physical Exam  Vitals reviewed.   Constitutional:       Appearance: Normal appearance.   HENT:      Head: Normocephalic.      Mouth/Throat:      Mouth: Mucous membranes are moist.      Pharynx: Oropharynx is clear.   Eyes:      Extraocular Movements: Extraocular movements intact.      Conjunctiva/sclera: Conjunctivae normal.      Pupils: Pupils are equal, round, and reactive to light.   Cardiovascular:      Rate and Rhythm: Normal rate and regular rhythm.   Pulmonary:      Effort: Pulmonary  "effort is normal.      Breath sounds: Normal breath sounds.   Abdominal:      General: Bowel sounds are normal.   Musculoskeletal:      Right lower leg: No edema.      Left lower leg: No edema.   Skin:     General: Skin is warm.      Capillary Refill: Capillary refill takes less than 2 seconds.      Comments: Small circular area of thickened skin   Neurological:      Mental Status: He is alert and oriented to person, place, and time.   Psychiatric:         Mood and Affect: Mood normal.       /88 (BP Location: Left arm, Patient Position: Sitting)   Pulse 87   Temp 98.4 °F (36.9 °C) (Oral)   Resp 18   Ht 5' 8" (1.727 m)   Wt 72.8 kg (160 lb 9.6 oz)   SpO2 99%   BMI 24.42 kg/m²     Assessment:       ICD-10-CM ICD-9-CM   1. Type 1 diabetes mellitus with other specified complication  E10.69 250.81   2. Hypertension, unspecified type  I10 401.9   3. Hyperlipidemia, unspecified hyperlipidemia type  E78.5 272.4   4. Lipohypertrophy  E65 278.1       Plan:     Medication List with Changes/Refills   Current Medications    AMLODIPINE (NORVASC) 5 MG TABLET    Take 1 tablet (5 mg total) by mouth once daily.    ATORVASTATIN (LIPITOR) 20 MG TABLET    Take 1 tablet (20 mg total) by mouth once daily.    BLOOD SUGAR DIAGNOSTIC STRP    To check BG 2 times daily, to use with insurance preferred meter    BLOOD-GLUCOSE METER KIT    To check BG 2 times daily, to use with insurance preferred meter    BLOOD-GLUCOSE SENSOR (DEXCOM G7 SENSOR) RAGINI    1 Device by Misc.(Non-Drug; Combo Route) route every 14 (fourteen) days.    DEXCOM G6  MISC        DEXCOM G6 SENSOR RAGINI    INJECT 1 EACH INTO THE SKIN EVERY 10 DAYS.    DEXCOM G6 TRANSMITTER RAGINI    USE AS DIRECTED    INSULIN GLULISINE U-100 (APIDRA U-100 INSULIN) 100 UNIT/ML INJECTION    INJECT 50 UNITS INTO THE SKIN DAILY    LANCETS MISC    To check BG 2 times daily, to use with insurance preferred meter    LOSARTAN-HYDROCHLOROTHIAZIDE 100-12.5 MG (HYZAAR) 100-12.5 MG TAB   " " Take 1 tablet by mouth once daily.    MAGNESIUM OXIDE-MG AA CHELATE (MG-PLUS-PROTEIN) 133 MG TAB    Take by mouth.    OMEGA-3 FATTY ACIDS/FISH OIL (FISH OIL-OMEGA-3 FATTY ACIDS) 300-1,000 MG CAPSULE    Take by mouth once daily.    OMNIPOD 5 G6 PODS, GEN 5, CRTG    INJECT 1 CARTRIDGE INTO THE SKIN EVERY 72 HOURS.    ONETOUCH DELICA PLUS LANCET 33 GAUGE MISC    CHECK BLOOD SUGAR TWICE A DAY    PEN NEEDLE, DIABETIC 31 GAUGE X 5/16" NDLE    1 each by Misc.(Non-Drug; Combo Route) route 4 (four) times daily.        1. Type 1 diabetes mellitus with other specified complication    2. Hypertension, unspecified type    3. Hyperlipidemia, unspecified hyperlipidemia type    4. Lipohypertrophy         Patient BP controlled with current medications    Too soon to check Lipid panel    Vision stable    Hyperglycemia, increased omnipod basal insulin to 0.8. He doesn't want me to touch the prandial insulin.     Recommended salicylic acid to site of skin thickening       Future Appointments   Date Time Provider Department Center   4/29/2025 10:45 AM Nicol Miller MD [facility]                "

## 2025-02-11 RX ORDER — INSULIN GLULISINE 100 [IU]/ML
50 INJECTION, SOLUTION SUBCUTANEOUS DAILY
Qty: 50 ML | Refills: 0 | Status: SHIPPED | OUTPATIENT
Start: 2025-02-11

## 2025-02-12 LAB
LEFT EYE DM RETINOPATHY: NEGATIVE
RIGHT EYE DM RETINOPATHY: NEGATIVE

## 2025-04-07 DIAGNOSIS — E10.69 TYPE 1 DIABETES MELLITUS WITH OTHER SPECIFIED COMPLICATION: ICD-10-CM

## 2025-04-07 RX ORDER — CIPROFLOXACIN HYDROCHLORIDE 3 MG/ML
SOLUTION/ DROPS OPHTHALMIC
COMMUNITY
Start: 2025-01-07

## 2025-04-07 RX ORDER — PREDNISOLONE ACETATE 10 MG/ML
SUSPENSION/ DROPS OPHTHALMIC
COMMUNITY
Start: 2024-12-17

## 2025-04-07 RX ORDER — INSULIN PMP CART,AUT,G6/7,CNTR
EACH SUBCUTANEOUS
COMMUNITY
Start: 2025-01-03

## 2025-04-07 RX ORDER — LOSARTAN POTASSIUM 50 MG/1
50 TABLET ORAL
COMMUNITY
Start: 2025-02-16

## 2025-04-07 RX ORDER — INSULIN PMP CART,AUT,G6/7,CNTR
EACH SUBCUTANEOUS
COMMUNITY
Start: 2025-03-26

## 2025-04-07 RX ORDER — NAPROXEN 500 MG/1
TABLET ORAL
COMMUNITY
Start: 2025-03-06

## 2025-04-07 RX ORDER — AMLODIPINE BESYLATE 5 MG/1
1 TABLET ORAL EVERY MORNING
COMMUNITY

## 2025-04-07 RX ORDER — ATORVASTATIN CALCIUM 20 MG/1
1 TABLET, FILM COATED ORAL NIGHTLY
COMMUNITY

## 2025-04-07 RX ORDER — BROMFENAC SODIUM 0.7 MG/ML
1 SOLUTION/ DROPS OPHTHALMIC EVERY MORNING
COMMUNITY
Start: 2025-02-11

## 2025-04-07 RX ORDER — LOSARTAN POTASSIUM AND HYDROCHLOROTHIAZIDE 25; 100 MG/1; MG/1
1 TABLET ORAL EVERY MORNING
COMMUNITY

## 2025-04-07 RX ORDER — BLOOD-GLUCOSE SENSOR
1 EACH MISCELLANEOUS
Qty: 3 EACH | Refills: 11 | Status: SHIPPED | OUTPATIENT
Start: 2025-04-07 | End: 2025-04-08

## 2025-04-07 NOTE — TELEPHONE ENCOUNTER
Max per insurance is 3 per 30 days. Please update script. Thank you.     ----- Message from Monique sent at 4/7/2025 11:38 AM CDT -----  Contact: AUDIE CUNNINGHAM [[id number]]  .Type:  Pharmacy Calling to Clarify an RXName of Caller: AUDIE CUNNINGHAM [[id number]]Pharmacy Name: [id number] - [place], [place] - [address] [address]Wayit: [phone] Fax: [phone] Name: DEXCOM G6 SENSOR DeviWhat do they need to clarify?: the prescription was written wrong. It needs to be changed to him changing the dexcom every 10 days not every 14 days. His insurance is not covering it for every 14 days. Best Call Back Number: [phone] (home) Additional Information:  Pharmacy Phone: [phone]

## 2025-04-08 ENCOUNTER — TELEPHONE (OUTPATIENT)
Dept: PRIMARY CARE CLINIC | Facility: CLINIC | Age: 30
End: 2025-04-08
Payer: COMMERCIAL

## 2025-04-08 DIAGNOSIS — E10.69 TYPE 1 DIABETES MELLITUS WITH OTHER SPECIFIED COMPLICATION: ICD-10-CM

## 2025-04-08 RX ORDER — BLOOD-GLUCOSE SENSOR
1 EACH MISCELLANEOUS
Qty: 3 EACH | Refills: 11 | Status: SHIPPED | OUTPATIENT
Start: 2025-04-08 | End: 2026-04-08

## 2025-04-08 RX ORDER — BLOOD-GLUCOSE SENSOR
EACH MISCELLANEOUS
Qty: 2 EACH | Refills: 1 | Status: SHIPPED | OUTPATIENT
Start: 2025-04-08 | End: 2025-04-09

## 2025-04-08 NOTE — TELEPHONE ENCOUNTER
Pt is on the Dexcom G7 sensors waiting on rx to be sent to his pharmacy so pharmacy will be able to order for pt and to see if a pa is needed to be completed

## 2025-04-08 NOTE — TELEPHONE ENCOUNTER
Need RX for the DEXCOM G7  SENSORS --pt states this is what he is on --pharmacy needs to order so if RX  can be sent in today pharmacy  can get in for him tomorrow

## 2025-04-08 NOTE — TELEPHONE ENCOUNTER
Working on completing his request for Dexcom g6 sensors with his ins--his ins faxed over forms to be complete--will also send in chart notes and last resultd labs--pt's ins can take up to 72 hrs to reply with a decision if approved or denied--pt informed

## 2025-04-08 NOTE — TELEPHONE ENCOUNTER
----- Message from Monique sent at 4/8/2025 12:33 PM CDT -----  Contact: AUDIE CUNNINGHAM [[id number]]  ..Type:  Patient Requesting CallWho Called: AUDIE CUNNINGHAM [[id number]]What is the call regarding?: pt is calling back checking on his refill. It says that it is waiting for authorization. If he will be able to get his medication today.Would the patient rather a call back or a response via [facility]?  [facility] Call Back Number: [phone] (home) Additional Information:

## 2025-04-09 RX ORDER — BLOOD-GLUCOSE SENSOR
EACH MISCELLANEOUS
Qty: 2 EACH | Refills: 1 | Status: SHIPPED | OUTPATIENT
Start: 2025-04-09

## 2025-04-11 ENCOUNTER — TELEPHONE (OUTPATIENT)
Dept: PRIMARY CARE CLINIC | Facility: CLINIC | Age: 30
End: 2025-04-11
Payer: COMMERCIAL

## 2025-04-11 NOTE — TELEPHONE ENCOUNTER
Pt and pharmacy informed of approval of his request for the Dexcom G7 Sensors --approved from 04/11/25-4/11/26--pt states he uses his phone for the reader

## 2025-04-29 ENCOUNTER — TELEPHONE (OUTPATIENT)
Dept: PRIMARY CARE CLINIC | Facility: CLINIC | Age: 30
End: 2025-04-29

## 2025-04-29 ENCOUNTER — OFFICE VISIT (OUTPATIENT)
Dept: PRIMARY CARE CLINIC | Facility: CLINIC | Age: 30
End: 2025-04-29
Payer: COMMERCIAL

## 2025-04-29 VITALS
BODY MASS INDEX: 24.8 KG/M2 | OXYGEN SATURATION: 99 % | HEIGHT: 68 IN | DIASTOLIC BLOOD PRESSURE: 86 MMHG | RESPIRATION RATE: 18 BRPM | HEART RATE: 83 BPM | SYSTOLIC BLOOD PRESSURE: 126 MMHG | WEIGHT: 163.63 LBS

## 2025-04-29 DIAGNOSIS — I10 HYPERTENSION, UNSPECIFIED TYPE: ICD-10-CM

## 2025-04-29 DIAGNOSIS — Z28.20 VACCINE REFUSED BY PATIENT: ICD-10-CM

## 2025-04-29 DIAGNOSIS — E10.69 TYPE 1 DIABETES MELLITUS WITH OTHER SPECIFIED COMPLICATION: Primary | ICD-10-CM

## 2025-04-29 DIAGNOSIS — E78.5 HYPERLIPIDEMIA, UNSPECIFIED HYPERLIPIDEMIA TYPE: ICD-10-CM

## 2025-04-29 LAB — HBA1C MFR BLD: 7.8 % (ref 4–6)

## 2025-04-29 PROCEDURE — 99213 OFFICE O/P EST LOW 20 MIN: CPT | Mod: S$PBB,,, | Performed by: INTERNAL MEDICINE

## 2025-04-29 PROCEDURE — 95251 CONT GLUC MNTR ANALYSIS I&R: CPT | Mod: ,,, | Performed by: INTERNAL MEDICINE

## 2025-04-29 NOTE — TELEPHONE ENCOUNTER
I LVM with Harriet Douglas to ask what it is the patient is needing. He was instructed this am in clinic to call Stella for any assistance.     ----- Message from Soraya sent at 4/29/2025  1:50 PM CDT -----  Contact: pt  Pt calling for clinical code for his dexcom meter and can be reached at [phone] Thanks,

## 2025-04-29 NOTE — PROGRESS NOTES
Subjective:      Patient ID: Modesto Nguyễn is a 30 y.o. male.    Chief Complaint: Follow-up (3 Month F/U)    HPI    Past Medical History:   Diagnosis Date    COVID     Type 1 diabetes mellitus        Patient with DM type 1 previously came in with uncontrolled HTN. Medications were changed/added. He was able to get retinal surgery done as his BP came down. Normal in clinic today. Also was started on a statin      Patient has an Omnipod and he was approved for a CGM 7 which he is trying to connect with the Omnipod  He does not know how to access his blood glucose readings and the readings that he supplied us with are insufficient  His basal has been increased to 0.9 units/hour which I changed on his Omnipod  He may need more classes on how to adjust his settings and he did say that sometimes the tubing comes off and may need more materials to keep it secure  He needs to discuss that with the Omnipod wrap and also to get the CGM 7 to connect with the Omnipod so we can get more readings  Nurse showed him how to access account and to e-mail us his readings to see if any further changes need to be made      Review of Systems   Constitutional:  Negative for chills and fever.   HENT:  Negative for hearing loss.    Eyes:  Negative for blurred vision.   Respiratory:  Negative for cough, shortness of breath and wheezing.    Cardiovascular:  Negative for chest pain, palpitations and leg swelling.   Gastrointestinal:  Negative for abdominal pain, blood in stool, constipation, diarrhea, melena, nausea and vomiting.   Genitourinary:  Negative for dysuria, frequency and urgency.   Musculoskeletal:  Negative for falls.   Skin:  Negative for rash.   Neurological:  Negative for dizziness and headaches.   Endo/Heme/Allergies:  Does not bruise/bleed easily.   Psychiatric/Behavioral:  Negative for depression. The patient is not nervous/anxious.      Objective:     Physical Exam  Vitals reviewed.   Constitutional:       Appearance:  "Normal appearance.   HENT:      Head: Normocephalic.      Mouth/Throat:      Mouth: Mucous membranes are moist.      Pharynx: Oropharynx is clear.   Eyes:      Extraocular Movements: Extraocular movements intact.      Conjunctiva/sclera: Conjunctivae normal.      Pupils: Pupils are equal, round, and reactive to light.   Cardiovascular:      Rate and Rhythm: Normal rate and regular rhythm.   Pulmonary:      Effort: Pulmonary effort is normal.      Breath sounds: Normal breath sounds.   Abdominal:      General: Bowel sounds are normal.   Musculoskeletal:      Right lower leg: No edema.      Left lower leg: No edema.   Skin:     General: Skin is warm.      Capillary Refill: Capillary refill takes less than 2 seconds.   Neurological:      Mental Status: He is alert and oriented to person, place, and time.   Psychiatric:         Mood and Affect: Mood normal.       /86 (BP Location: Right arm, Patient Position: Sitting)   Pulse 83   Resp 18   Ht 5' 8" (1.727 m)   Wt 74.2 kg (163 lb 9.6 oz)   SpO2 99%   BMI 24.88 kg/m²     Assessment:       ICD-10-CM ICD-9-CM   1. Type 1 diabetes mellitus with other specified complication  E10.69 250.81   2. Vaccine refused by patient  Z28.20 V64.09   3. Hypertension, unspecified type  I10 401.9   4. Hyperlipidemia, unspecified hyperlipidemia type  E78.5 272.4       Plan:     Medication List with Changes/Refills   Current Medications    AMLODIPINE (NORVASC) 5 MG TABLET    Take 1 tablet (5 mg total) by mouth once daily.    AMLODIPINE (NORVASC) 5 MG TABLET    Take 1 tablet by mouth every morning.    ATORVASTATIN (LIPITOR) 20 MG TABLET    Take 1 tablet (20 mg total) by mouth once daily.    ATORVASTATIN (LIPITOR) 20 MG TABLET    Take 1 tablet by mouth every evening.    BLOOD SUGAR DIAGNOSTIC STRP    To check BG 2 times daily, to use with insurance preferred meter    BLOOD-GLUCOSE METER KIT    To check BG 2 times daily, to use with insurance preferred meter    BROMFENAC (PROLENSA) " "0.07 % DROP    1 drop every morning.    CIPROFLOXACIN HCL (CILOXAN) 0.3 % OPHTHALMIC SOLUTION    INSTILL 1 DROP INTO RIGHT EYE 4 TIMES A DAY    DEXCOM G6  MISC        DEXCOM G6 SENSOR RAGINI    INJECT 1 EACH INTO THE SKIN EVERY 10 DAYS.    DEXCOM G6 TRANSMITTER RAGINI    USE AS DIRECTED    DEXCOM G7 SENSOR RAGINI    USE 1 SENSOR EVERY 14 DAYS    INSULIN GLULISINE U-100 (APIDRA U-100 INSULIN) 100 UNIT/ML INJECTION    INJECT 50 UNITS INTO THE SKIN DAILY    LANCETS MISC    To check BG 2 times daily, to use with insurance preferred meter    LOSARTAN (COZAAR) 50 MG TABLET    Take 50 mg by mouth.    LOSARTAN-HYDROCHLOROTHIAZIDE 100-12.5 MG (HYZAAR) 100-12.5 MG TAB    Take 1 tablet by mouth once daily.    LOSARTAN-HYDROCHLOROTHIAZIDE 100-25 MG (HYZAAR) 100-25 MG PER TABLET    Take 1 tablet by mouth every morning.    MAGNESIUM OXIDE-MG AA CHELATE (MG-PLUS-PROTEIN) 133 MG TAB    Take by mouth.    NAPROXEN (NAPROSYN) 500 MG TABLET    TAKE ONE TABLET BY MOUTH TWICE A DAY WITH MEALS FOR 7 DAYS    OMEGA-3 FATTY ACIDS/FISH OIL (FISH OIL-OMEGA-3 FATTY ACIDS) 300-1,000 MG CAPSULE    Take by mouth once daily.    OMNIPOD 5 G6-G7 PODS, GEN 5, CRTG    CHANGE PODS EVERY TWO DAYS    OMNIPOD 5 G6-G7 PODS, GEN 5, CRTG    USE FOR INJECT INSULIN AS DIRECTED BY PHYSICIAN INJECTION. CHANGE PODS EVERY 48 HOURS    ONETOUCH DELICA PLUS LANCET 33 GAUGE MISC    CHECK BLOOD SUGAR TWICE A DAY    PEN NEEDLE, DIABETIC 31 GAUGE X 5/16" NDLE    1 each by Misc.(Non-Drug; Combo Route) route 4 (four) times daily.    PREDNISOLONE ACETATE (PRED FORTE) 1 % DRPS    INSTILL 1 DROP INTO LEFT EYE 4 TIMES A DAY        1. Type 1 diabetes mellitus with other specified complication  -     Microalbumin/creatinine urine ratio    2. Vaccine refused by patient    3. Hypertension, unspecified type    4. Hyperlipidemia, unspecified hyperlipidemia type          Continue current medications    Need better readings need more information discuss with RAP regarding materials " to secure Omnipod to skin and need more CGM readings which he said he will e-mail  A1c 7.8 need better control    Future Appointments   Date Time Provider Department Center   7/29/2025 11:00 AM Nicol Miller MD [facility]

## 2025-05-02 ENCOUNTER — TELEPHONE (OUTPATIENT)
Facility: CLINIC | Age: 30
End: 2025-05-02
Payer: COMMERCIAL

## 2025-05-02 NOTE — TELEPHONE ENCOUNTER
----- Message from Monique sent at 5/2/2025 11:50 AM CDT -----  Contact: AUDIE CUNNINGHAM [[id number]]  ...Type:  Patient Returning CallWho Called: AUDIE CUNNINGHAM [[id number]]What is the call regarding?: pt missed call and is returning the call.Would the patient rather a call back or a response via [employer]? [employer] Call Back Number: [phone] (home)  Additional Information:

## 2025-05-21 RX ORDER — INSULIN PMP CART,AUT,G6/7,CNTR
1 EACH SUBCUTANEOUS
Qty: 10 EACH | Refills: 0 | Status: SHIPPED | OUTPATIENT
Start: 2025-05-21

## 2025-05-21 RX ORDER — INSULIN PMP CART,AUT,G6/7,CNTR
1 EACH SUBCUTANEOUS
Qty: 10 EACH | Refills: 1 | Status: SHIPPED | OUTPATIENT
Start: 2025-05-21

## 2025-05-27 RX ORDER — INSULIN GLULISINE 100 [IU]/ML
50 INJECTION, SOLUTION SUBCUTANEOUS DAILY
Qty: 50 ML | Refills: 3 | Status: SHIPPED | OUTPATIENT
Start: 2025-05-27

## 2025-06-12 DIAGNOSIS — E10.69 TYPE 1 DIABETES MELLITUS WITH OTHER SPECIFIED COMPLICATION: ICD-10-CM

## 2025-06-12 RX ORDER — BLOOD-GLUCOSE SENSOR
EACH MISCELLANEOUS
Qty: 2 EACH | Refills: 1 | Status: SHIPPED | OUTPATIENT
Start: 2025-06-12

## 2025-08-12 ENCOUNTER — TELEPHONE (OUTPATIENT)
Dept: PRIMARY CARE CLINIC | Facility: CLINIC | Age: 30
End: 2025-08-12
Payer: MEDICAID

## 2025-08-18 ENCOUNTER — OFFICE VISIT (OUTPATIENT)
Dept: PRIMARY CARE CLINIC | Facility: CLINIC | Age: 30
End: 2025-08-18
Payer: MEDICAID

## 2025-08-18 VITALS
DIASTOLIC BLOOD PRESSURE: 95 MMHG | HEART RATE: 80 BPM | HEIGHT: 68 IN | OXYGEN SATURATION: 99 % | BODY MASS INDEX: 25.61 KG/M2 | WEIGHT: 169 LBS | SYSTOLIC BLOOD PRESSURE: 140 MMHG

## 2025-08-18 DIAGNOSIS — E78.5 HYPERLIPIDEMIA, UNSPECIFIED HYPERLIPIDEMIA TYPE: ICD-10-CM

## 2025-08-18 DIAGNOSIS — Z28.20 VACCINE REFUSED BY PATIENT: ICD-10-CM

## 2025-08-18 DIAGNOSIS — E10.69 TYPE 1 DIABETES MELLITUS WITH OTHER SPECIFIED COMPLICATION: Primary | ICD-10-CM

## 2025-08-18 DIAGNOSIS — I10 HYPERTENSION, UNSPECIFIED TYPE: ICD-10-CM

## 2025-08-18 DIAGNOSIS — E10.69 TYPE 1 DIABETES MELLITUS WITH OTHER SPECIFIED COMPLICATION: ICD-10-CM

## 2025-08-18 LAB
ESTIMATED AVERAGE GLUCOSE: 192 MG/DL (ref 70–126)
HBA1C MFR BLD: 8.3 % (ref 4–5.6)

## 2025-08-18 PROCEDURE — 4010F ACE/ARB THERAPY RXD/TAKEN: CPT | Mod: CPTII,,, | Performed by: INTERNAL MEDICINE

## 2025-08-18 PROCEDURE — 2023F DILAT RTA XM W/O RTNOPTHY: CPT | Mod: CPTII,,, | Performed by: INTERNAL MEDICINE

## 2025-08-18 PROCEDURE — 3080F DIAST BP >= 90 MM HG: CPT | Mod: CPTII,,, | Performed by: INTERNAL MEDICINE

## 2025-08-18 PROCEDURE — 3008F BODY MASS INDEX DOCD: CPT | Mod: CPTII,,, | Performed by: INTERNAL MEDICINE

## 2025-08-18 PROCEDURE — 3077F SYST BP >= 140 MM HG: CPT | Mod: CPTII,,, | Performed by: INTERNAL MEDICINE

## 2025-08-18 PROCEDURE — 3052F HG A1C>EQUAL 8.0%<EQUAL 9.0%: CPT | Mod: CPTII,,, | Performed by: INTERNAL MEDICINE

## 2025-08-18 PROCEDURE — 95251 CONT GLUC MNTR ANALYSIS I&R: CPT | Mod: ,,, | Performed by: INTERNAL MEDICINE

## 2025-08-18 PROCEDURE — 99213 OFFICE O/P EST LOW 20 MIN: CPT | Mod: S$PBB,,, | Performed by: INTERNAL MEDICINE

## 2025-08-18 PROCEDURE — 1159F MED LIST DOCD IN RCRD: CPT | Mod: CPTII,,, | Performed by: INTERNAL MEDICINE

## 2025-08-18 RX ORDER — INSULIN PMP CART,AUT,G6/7,CNTR
1 EACH SUBCUTANEOUS
Qty: 10 EACH | Refills: 0 | Status: SHIPPED | OUTPATIENT
Start: 2025-08-18 | End: 2025-08-19

## 2025-08-18 RX ORDER — INSULIN PMP CART,AUT,G6/7,CNTR
1 EACH SUBCUTANEOUS
Qty: 10 EACH | Refills: 1 | Status: SHIPPED | OUTPATIENT
Start: 2025-08-18 | End: 2025-08-22

## 2025-08-18 RX ORDER — INSULIN PMP CART,AUT,G6/7,CNTR
1 EACH SUBCUTANEOUS
Qty: 10 EACH | Refills: 0 | Status: SHIPPED | OUTPATIENT
Start: 2025-08-18 | End: 2025-08-18

## 2025-08-19 RX ORDER — INSULIN PMP CART,AUT,G6/7,CNTR
EACH SUBCUTANEOUS
Qty: 10 EACH | Refills: 0 | Status: SHIPPED | OUTPATIENT
Start: 2025-08-19

## 2025-08-20 ENCOUNTER — TELEPHONE (OUTPATIENT)
Dept: PRIMARY CARE CLINIC | Facility: CLINIC | Age: 30
End: 2025-08-20
Payer: MEDICAID

## 2025-08-21 ENCOUNTER — TELEPHONE (OUTPATIENT)
Dept: PRIMARY CARE CLINIC | Facility: CLINIC | Age: 30
End: 2025-08-21
Payer: MEDICAID

## 2025-08-22 RX ORDER — INSULIN PMP CART,AUT,G6/7,CNTR
1 EACH SUBCUTANEOUS
Qty: 10 EACH | Refills: 1 | Status: SHIPPED | OUTPATIENT
Start: 2025-08-22

## 2025-08-25 DIAGNOSIS — E10.69 TYPE 1 DIABETES MELLITUS WITH OTHER SPECIFIED COMPLICATION: ICD-10-CM

## 2025-08-26 ENCOUNTER — TELEPHONE (OUTPATIENT)
Dept: PRIMARY CARE CLINIC | Facility: CLINIC | Age: 30
End: 2025-08-26
Payer: MEDICAID

## 2025-08-26 RX ORDER — INSULIN PMP CART,AUT,G6/7,CNTR
1 EACH SUBCUTANEOUS
Qty: 10 EACH | Refills: 1 | OUTPATIENT
Start: 2025-08-26

## 2025-09-02 DIAGNOSIS — E10.69 TYPE 1 DIABETES MELLITUS WITH OTHER SPECIFIED COMPLICATION: ICD-10-CM

## 2025-09-03 RX ORDER — BLOOD-GLUCOSE SENSOR
EACH MISCELLANEOUS
Qty: 2 EACH | Refills: 1 | Status: SHIPPED | OUTPATIENT
Start: 2025-09-03